# Patient Record
Sex: FEMALE | Race: WHITE | NOT HISPANIC OR LATINO | URBAN - METROPOLITAN AREA
[De-identification: names, ages, dates, MRNs, and addresses within clinical notes are randomized per-mention and may not be internally consistent; named-entity substitution may affect disease eponyms.]

---

## 2019-09-11 ENCOUNTER — INPATIENT (INPATIENT)
Facility: HOSPITAL | Age: 84
LOS: 2 days | Discharge: HOME | DRG: 101 | End: 2019-09-14
Attending: HOSPITALIST | Admitting: HOSPITALIST
Payer: MEDICARE

## 2019-09-11 VITALS
DIASTOLIC BLOOD PRESSURE: 78 MMHG | SYSTOLIC BLOOD PRESSURE: 133 MMHG | TEMPERATURE: 98 F | HEART RATE: 64 BPM | OXYGEN SATURATION: 100 % | RESPIRATION RATE: 17 BRPM

## 2019-09-11 DIAGNOSIS — R56.9 UNSPECIFIED CONVULSIONS: ICD-10-CM

## 2019-09-11 LAB
ALBUMIN SERPL ELPH-MCNC: 3.5 G/DL — SIGNIFICANT CHANGE UP (ref 3.3–5)
ALP SERPL-CCNC: 75 U/L — SIGNIFICANT CHANGE UP (ref 40–120)
ALT FLD-CCNC: 9 U/L — LOW (ref 10–45)
ANION GAP SERPL CALC-SCNC: 12 MMOL/L — SIGNIFICANT CHANGE UP (ref 5–17)
APPEARANCE UR: CLEAR — SIGNIFICANT CHANGE UP
APTT BLD: 28.7 SEC — SIGNIFICANT CHANGE UP (ref 27.5–36.3)
AST SERPL-CCNC: 12 U/L — SIGNIFICANT CHANGE UP (ref 10–40)
BACTERIA # UR AUTO: ABNORMAL
BASOPHILS # BLD AUTO: 0.1 K/UL — SIGNIFICANT CHANGE UP (ref 0–0.2)
BASOPHILS NFR BLD AUTO: 0.7 % — SIGNIFICANT CHANGE UP (ref 0–2)
BILIRUB SERPL-MCNC: 0.3 MG/DL — SIGNIFICANT CHANGE UP (ref 0.2–1.2)
BILIRUB UR-MCNC: NEGATIVE — SIGNIFICANT CHANGE UP
BUN SERPL-MCNC: 19 MG/DL — SIGNIFICANT CHANGE UP (ref 7–23)
CALCIUM SERPL-MCNC: 8.6 MG/DL — SIGNIFICANT CHANGE UP (ref 8.4–10.5)
CHLORIDE SERPL-SCNC: 104 MMOL/L — SIGNIFICANT CHANGE UP (ref 96–108)
CO2 SERPL-SCNC: 24 MMOL/L — SIGNIFICANT CHANGE UP (ref 22–31)
COLOR SPEC: COLORLESS — SIGNIFICANT CHANGE UP
CREAT SERPL-MCNC: 0.92 MG/DL — SIGNIFICANT CHANGE UP (ref 0.5–1.3)
DIFF PNL FLD: ABNORMAL
EOSINOPHIL # BLD AUTO: 0.4 K/UL — SIGNIFICANT CHANGE UP (ref 0–0.5)
EOSINOPHIL NFR BLD AUTO: 4.2 % — SIGNIFICANT CHANGE UP (ref 0–6)
EPI CELLS # UR: 1 /HPF — SIGNIFICANT CHANGE UP
GAS PNL BLDV: SIGNIFICANT CHANGE UP
GLUCOSE SERPL-MCNC: 102 MG/DL — HIGH (ref 70–99)
GLUCOSE UR QL: NEGATIVE — SIGNIFICANT CHANGE UP
HCT VFR BLD CALC: 42 % — SIGNIFICANT CHANGE UP (ref 34.5–45)
HGB BLD-MCNC: 13.5 G/DL — SIGNIFICANT CHANGE UP (ref 11.5–15.5)
HYALINE CASTS # UR AUTO: 0 /LPF — SIGNIFICANT CHANGE UP (ref 0–2)
INR BLD: 0.96 RATIO — SIGNIFICANT CHANGE UP (ref 0.88–1.16)
KETONES UR-MCNC: NEGATIVE — SIGNIFICANT CHANGE UP
LEUKOCYTE ESTERASE UR-ACNC: ABNORMAL
LYMPHOCYTES # BLD AUTO: 2.5 K/UL — SIGNIFICANT CHANGE UP (ref 1–3.3)
LYMPHOCYTES # BLD AUTO: 28 % — SIGNIFICANT CHANGE UP (ref 13–44)
MCHC RBC-ENTMCNC: 29.7 PG — SIGNIFICANT CHANGE UP (ref 27–34)
MCHC RBC-ENTMCNC: 32.1 GM/DL — SIGNIFICANT CHANGE UP (ref 32–36)
MCV RBC AUTO: 92.4 FL — SIGNIFICANT CHANGE UP (ref 80–100)
MONOCYTES # BLD AUTO: 0.9 K/UL — SIGNIFICANT CHANGE UP (ref 0–0.9)
MONOCYTES NFR BLD AUTO: 9.9 % — SIGNIFICANT CHANGE UP (ref 2–14)
NEUTROPHILS # BLD AUTO: 5 K/UL — SIGNIFICANT CHANGE UP (ref 1.8–7.4)
NEUTROPHILS NFR BLD AUTO: 57.3 % — SIGNIFICANT CHANGE UP (ref 43–77)
NITRITE UR-MCNC: POSITIVE
PH UR: 7 — SIGNIFICANT CHANGE UP (ref 5–8)
PLATELET # BLD AUTO: 219 K/UL — SIGNIFICANT CHANGE UP (ref 150–400)
POTASSIUM SERPL-MCNC: 4 MMOL/L — SIGNIFICANT CHANGE UP (ref 3.5–5.3)
POTASSIUM SERPL-SCNC: 4 MMOL/L — SIGNIFICANT CHANGE UP (ref 3.5–5.3)
PROT SERPL-MCNC: 6.2 G/DL — SIGNIFICANT CHANGE UP (ref 6–8.3)
PROT UR-MCNC: NEGATIVE — SIGNIFICANT CHANGE UP
PROTHROM AB SERPL-ACNC: 10.9 SEC — SIGNIFICANT CHANGE UP (ref 10–12.9)
RBC # BLD: 4.54 M/UL — SIGNIFICANT CHANGE UP (ref 3.8–5.2)
RBC # FLD: 12.1 % — SIGNIFICANT CHANGE UP (ref 10.3–14.5)
RBC CASTS # UR COMP ASSIST: 5 /HPF — HIGH (ref 0–4)
SODIUM SERPL-SCNC: 140 MMOL/L — SIGNIFICANT CHANGE UP (ref 135–145)
SP GR SPEC: 1.01 — LOW (ref 1.01–1.02)
UROBILINOGEN FLD QL: NEGATIVE — SIGNIFICANT CHANGE UP
WBC # BLD: 8.8 K/UL — SIGNIFICANT CHANGE UP (ref 3.8–10.5)
WBC # FLD AUTO: 8.8 K/UL — SIGNIFICANT CHANGE UP (ref 3.8–10.5)
WBC UR QL: 7 /HPF — HIGH (ref 0–5)

## 2019-09-11 PROCEDURE — 93010 ELECTROCARDIOGRAM REPORT: CPT

## 2019-09-11 PROCEDURE — 71045 X-RAY EXAM CHEST 1 VIEW: CPT | Mod: 26

## 2019-09-11 PROCEDURE — 99285 EMERGENCY DEPT VISIT HI MDM: CPT

## 2019-09-11 PROCEDURE — 99497 ADVNCD CARE PLAN 30 MIN: CPT | Mod: 25

## 2019-09-11 PROCEDURE — 70450 CT HEAD/BRAIN W/O DYE: CPT | Mod: 26

## 2019-09-11 PROCEDURE — 99223 1ST HOSP IP/OBS HIGH 75: CPT

## 2019-09-11 RX ORDER — SODIUM CHLORIDE 9 MG/ML
1000 INJECTION INTRAMUSCULAR; INTRAVENOUS; SUBCUTANEOUS ONCE
Refills: 0 | Status: COMPLETED | OUTPATIENT
Start: 2019-09-11 | End: 2019-09-11

## 2019-09-11 RX ORDER — ENOXAPARIN SODIUM 100 MG/ML
40 INJECTION SUBCUTANEOUS DAILY
Refills: 0 | Status: DISCONTINUED | OUTPATIENT
Start: 2019-09-11 | End: 2019-09-14

## 2019-09-11 RX ORDER — CEFTRIAXONE 500 MG/1
1000 INJECTION, POWDER, FOR SOLUTION INTRAMUSCULAR; INTRAVENOUS ONCE
Refills: 0 | Status: COMPLETED | OUTPATIENT
Start: 2019-09-11 | End: 2019-09-11

## 2019-09-11 RX ADMIN — CEFTRIAXONE 100 MILLIGRAM(S): 500 INJECTION, POWDER, FOR SOLUTION INTRAMUSCULAR; INTRAVENOUS at 21:30

## 2019-09-11 RX ADMIN — SODIUM CHLORIDE 1000 MILLILITER(S): 9 INJECTION INTRAMUSCULAR; INTRAVENOUS; SUBCUTANEOUS at 18:54

## 2019-09-11 NOTE — ED PROVIDER NOTE - OBJECTIVE STATEMENT
Pt is coming from  [facility], pt is nonverbal at baseline and had a seizure today at 3 PM and had no hx of trauma, she was sitting and this is her first time seizure,   not normally on oxygen.   PMH DM/HTN/HLD/CHF  Pts daughter arrived at 4PM and was having these generalized jerks. PT in the emergency room arrived and is not at mental status baseline. She is intermittently arousable able to state "hi".   PT per daughter is not at her usual mental status, she is more talkative.

## 2019-09-11 NOTE — ED ADULT NURSE NOTE - NSIMPLEMENTINTERV_GEN_ALL_ED
Implemented All Fall with Harm Risk Interventions:  [place] to call system. Call bell, personal items and telephone within reach. Instruct patient to call for assistance. Room bathroom lighting operational. Non-slip footwear when patient is off stretcher. Physically safe environment: no spills, clutter or unnecessary equipment. Stretcher in lowest position, wheels locked, appropriate side rails in place. Provide visual cue, wrist band, yellow gown, etc. Monitor gait and stability. Monitor for mental status changes and reorient to person, place, and time. Review medications for side effects contributing to fall risk. Reinforce activity limits and safety measures with patient and family. Provide visual clues: red socks.

## 2019-09-11 NOTE — ED ADULT NURSE NOTE - OBJECTIVE STATEMENT
Pt presents to ED from nursing home accompanied by family s/p suspected seizure. Family states pt has h/o dementia and is normally A&Ox1, nonverbal besides "yes" and "no" and recognizes her family. Pt can take a few steps but mostly goes from bed to chair. Nursing home staff called daughter to tell her pt had an episode of entire body shaking follow by "twitching" and sleepiness. Pt on arrival is responsive to pain only and not speaking. MAEx4. Respirations are even and unlabored. Pt presents to ED from nursing home via EMS accompanied by family s/p suspected seizure. Family states pt has h/o dementia and is normally A&Ox1, nonverbal besides "yes" and "no" answers and recognizes her family. Pt can take a few steps but mostly goes from bed to chair. Nursing home staff called daughter to tell her pt had an episode of entire body shaking follow by "twitching" and sleepiness. Daughter reports a few weeks ago pt "slid out of the wheelchair" and reportedly did not hit her head or seek medical care at that time. Pt on arrival is responsive to pain only and not speaking. Unable to follow commands. MAEx4. Respirations are even and unlabored.

## 2019-09-11 NOTE — ED PROVIDER NOTE - PROGRESS NOTE DETAILS
Neurology was paged, for concern the patient is having prolonged post ictal period Spoke to  Adolfo Lara who reports witnessed event- upper and lower extremities shaking associated with eyes rolled back unsure duration but either seconds or a minute.  After patient was not at her normal baseline adding patient usually sits up and looks around, does not say anything but usually seems alert however patient just laying down.  No history of seizures. Meggan Blanco MD per patients daughter, the patient is currently improving w/ her mental status

## 2019-09-11 NOTE — ED PROVIDER NOTE - CLINICAL SUMMARY MEDICAL DECISION MAKING FREE TEXT BOX
83 F w/ PMH of dementia, minimally verbal at baseline (pt states the family is not at her normal mental status baseline p/w likely new onset seizure. Generalized jerks and eyes rolled back in the patients head per the patients daughter.     plan for labs, ekg and 83 F w/ PMH of dementia, minimally verbal at baseline (pt states the family is not at her normal mental status baseline p/w likely new onset seizure. Generalized jerks and eyes rolled back in the patients head per the patients daughter.   plan for labs, ekg and CT head

## 2019-09-11 NOTE — ED CLERICAL - NS ED CLERK NOTE PRE-ARRIVAL INFORMATION; ADDITIONAL PRE-ARRIVAL INFORMATION
CC/Reason For referral: pt needs cat-scan of head/ onset of siezures  Preferred Consultant(if applicable):  Who admits for you (if needed):  Do you have documents you would like to fax over?  Would you still like to speak to an ED attending? yes

## 2019-09-11 NOTE — ED PROVIDER NOTE - PHYSICAL EXAMINATION
I have reviewed the triage vital signs.    Const: obese,  deconditioned chronically ill appearing  Eyes: no conjunctival injection and no scleral icterus  ENMT: Atraumatic external nose and ears, Moist mucus membranes, pupils are 4 mm reactive bilaterally  Neck: Symmetric, trachea midline,  CVS: +S1/S2, radial pulse 2+ bilaterally  RESP: Unlabored respiratory effort, Clear to auscultation bilaterally  GI: Nontender/Nondistended soft abdomen  MSK: Normocephalic/Atraumatic, Extremities w/o deformity or ttp, stiff in lower extremities and flacid in upper extremities  Skin: Warm, Dry w/ no rashes  Neuro: able to move bilateral arms and lower legs are stiff, withdraws from pain in arms and legs,   Psych: Awake, Alert, & Orientedx3;  Appropriate mood and affect, cooperative I have reviewed the triage vital signs.    Const: obese,  deconditioned chronically ill appearing  Eyes: no conjunctival injection and no scleral icterus  ENMT: Atraumatic external nose and ears, Moist mucus membranes, pupils are 4 mm reactive bilaterally  Neck: Symmetric, trachea midline,  CVS: +S1/S2, radial pulse 2+ bilaterally  RESP: Unlabored respiratory effort, Clear to auscultation bilaterally  GI: Nontender/Nondistended soft abdomen  MSK: Normocephalic/Atraumatic, Extremities w/o deformity or ttp, stiff in lower extremities and flacid in upper extremities  Skin: Warm, Dry w/ no rashes  Neuro: able to move bilateral arms and lower legs are stiff, withdraws from pain in arms and legs,   Psych: unable to assess

## 2019-09-12 DIAGNOSIS — R94.31 ABNORMAL ELECTROCARDIOGRAM [ECG] [EKG]: ICD-10-CM

## 2019-09-12 DIAGNOSIS — Z98.890 OTHER SPECIFIED POSTPROCEDURAL STATES: Chronic | ICD-10-CM

## 2019-09-12 DIAGNOSIS — N39.0 URINARY TRACT INFECTION, SITE NOT SPECIFIED: ICD-10-CM

## 2019-09-12 DIAGNOSIS — R56.9 UNSPECIFIED CONVULSIONS: ICD-10-CM

## 2019-09-12 DIAGNOSIS — E11.9 TYPE 2 DIABETES MELLITUS WITHOUT COMPLICATIONS: ICD-10-CM

## 2019-09-12 DIAGNOSIS — I10 ESSENTIAL (PRIMARY) HYPERTENSION: ICD-10-CM

## 2019-09-12 DIAGNOSIS — G30.9 ALZHEIMER'S DISEASE, UNSPECIFIED: ICD-10-CM

## 2019-09-12 LAB
AMMONIA BLD-MCNC: 20 UMOL/L — SIGNIFICANT CHANGE UP (ref 11–55)
ANION GAP SERPL CALC-SCNC: 13 MMOL/L — SIGNIFICANT CHANGE UP (ref 5–17)
BUN SERPL-MCNC: 14 MG/DL — SIGNIFICANT CHANGE UP (ref 7–23)
CALCIUM SERPL-MCNC: 8.4 MG/DL — SIGNIFICANT CHANGE UP (ref 8.4–10.5)
CHLORIDE SERPL-SCNC: 107 MMOL/L — SIGNIFICANT CHANGE UP (ref 96–108)
CO2 SERPL-SCNC: 23 MMOL/L — SIGNIFICANT CHANGE UP (ref 22–31)
CREAT SERPL-MCNC: 0.81 MG/DL — SIGNIFICANT CHANGE UP (ref 0.5–1.3)
CRP SERPL-MCNC: 0.41 MG/DL — HIGH (ref 0–0.4)
ERYTHROCYTE [SEDIMENTATION RATE] IN BLOOD: 28 MM/HR — HIGH (ref 0–20)
GLUCOSE SERPL-MCNC: 87 MG/DL — SIGNIFICANT CHANGE UP (ref 70–99)
HCT VFR BLD CALC: 40.2 % — SIGNIFICANT CHANGE UP (ref 34.5–45)
HGB BLD-MCNC: 12.7 G/DL — SIGNIFICANT CHANGE UP (ref 11.5–15.5)
MAGNESIUM SERPL-MCNC: 2.3 MG/DL — SIGNIFICANT CHANGE UP (ref 1.6–2.6)
MCHC RBC-ENTMCNC: 29.4 PG — SIGNIFICANT CHANGE UP (ref 27–34)
MCHC RBC-ENTMCNC: 31.6 GM/DL — LOW (ref 32–36)
MCV RBC AUTO: 93.1 FL — SIGNIFICANT CHANGE UP (ref 80–100)
PHOSPHATE SERPL-MCNC: 2.9 MG/DL — SIGNIFICANT CHANGE UP (ref 2.5–4.5)
PLATELET # BLD AUTO: 196 K/UL — SIGNIFICANT CHANGE UP (ref 150–400)
POTASSIUM SERPL-MCNC: 4.4 MMOL/L — SIGNIFICANT CHANGE UP (ref 3.5–5.3)
POTASSIUM SERPL-SCNC: 4.4 MMOL/L — SIGNIFICANT CHANGE UP (ref 3.5–5.3)
PROCALCITONIN SERPL-MCNC: 0.04 NG/ML — SIGNIFICANT CHANGE UP (ref 0.02–0.1)
PROLACTIN SERPL-MCNC: 21.5 NG/ML — SIGNIFICANT CHANGE UP (ref 3.4–24.1)
RBC # BLD: 4.32 M/UL — SIGNIFICANT CHANGE UP (ref 3.8–5.2)
RBC # FLD: 13.2 % — SIGNIFICANT CHANGE UP (ref 10.3–14.5)
SODIUM SERPL-SCNC: 143 MMOL/L — SIGNIFICANT CHANGE UP (ref 135–145)
T PALLIDUM AB TITR SER: NEGATIVE — SIGNIFICANT CHANGE UP
TSH SERPL-MCNC: 1.79 UIU/ML — SIGNIFICANT CHANGE UP (ref 0.27–4.2)
WBC # BLD: 7.93 K/UL — SIGNIFICANT CHANGE UP (ref 3.8–10.5)
WBC # FLD AUTO: 7.93 K/UL — SIGNIFICANT CHANGE UP (ref 3.8–10.5)

## 2019-09-12 PROCEDURE — 95816 EEG AWAKE AND DROWSY: CPT | Mod: 26

## 2019-09-12 PROCEDURE — 99222 1ST HOSP IP/OBS MODERATE 55: CPT

## 2019-09-12 PROCEDURE — 12345: CPT | Mod: NC

## 2019-09-12 RX ORDER — DEXTROSE 50 % IN WATER 50 %
25 SYRINGE (ML) INTRAVENOUS ONCE
Refills: 0 | Status: DISCONTINUED | OUTPATIENT
Start: 2019-09-12 | End: 2019-09-14

## 2019-09-12 RX ORDER — AMLODIPINE BESYLATE 2.5 MG/1
5 TABLET ORAL DAILY
Refills: 0 | Status: DISCONTINUED | OUTPATIENT
Start: 2019-09-12 | End: 2019-09-14

## 2019-09-12 RX ORDER — METFORMIN HYDROCHLORIDE 850 MG/1
1 TABLET ORAL
Qty: 0 | Refills: 0 | DISCHARGE

## 2019-09-12 RX ORDER — GLUCAGON INJECTION, SOLUTION 0.5 MG/.1ML
1 INJECTION, SOLUTION SUBCUTANEOUS ONCE
Refills: 0 | Status: DISCONTINUED | OUTPATIENT
Start: 2019-09-12 | End: 2019-09-14

## 2019-09-12 RX ORDER — INSULIN LISPRO 100/ML
VIAL (ML) SUBCUTANEOUS AT BEDTIME
Refills: 0 | Status: DISCONTINUED | OUTPATIENT
Start: 2019-09-12 | End: 2019-09-14

## 2019-09-12 RX ORDER — INFLUENZA VIRUS VACCINE 15; 15; 15; 15 UG/.5ML; UG/.5ML; UG/.5ML; UG/.5ML
0.5 SUSPENSION INTRAMUSCULAR ONCE
Refills: 0 | Status: DISCONTINUED | OUTPATIENT
Start: 2019-09-12 | End: 2019-09-14

## 2019-09-12 RX ORDER — AMLODIPINE BESYLATE 2.5 MG/1
1 TABLET ORAL
Qty: 0 | Refills: 0 | DISCHARGE

## 2019-09-12 RX ORDER — VENLAFAXINE HCL 75 MG
75 CAPSULE, EXT RELEASE 24 HR ORAL DAILY
Refills: 0 | Status: DISCONTINUED | OUTPATIENT
Start: 2019-09-12 | End: 2019-09-14

## 2019-09-12 RX ORDER — INSULIN LISPRO 100/ML
VIAL (ML) SUBCUTANEOUS
Refills: 0 | Status: DISCONTINUED | OUTPATIENT
Start: 2019-09-12 | End: 2019-09-14

## 2019-09-12 RX ORDER — SIMVASTATIN 20 MG/1
20 TABLET, FILM COATED ORAL AT BEDTIME
Refills: 0 | Status: DISCONTINUED | OUTPATIENT
Start: 2019-09-12 | End: 2019-09-14

## 2019-09-12 RX ORDER — SIMVASTATIN 20 MG/1
1 TABLET, FILM COATED ORAL
Qty: 0 | Refills: 0 | DISCHARGE

## 2019-09-12 RX ORDER — DEXTROSE 50 % IN WATER 50 %
12.5 SYRINGE (ML) INTRAVENOUS ONCE
Refills: 0 | Status: DISCONTINUED | OUTPATIENT
Start: 2019-09-12 | End: 2019-09-14

## 2019-09-12 RX ORDER — CEFTRIAXONE 500 MG/1
1000 INJECTION, POWDER, FOR SOLUTION INTRAMUSCULAR; INTRAVENOUS EVERY 24 HOURS
Refills: 0 | Status: DISCONTINUED | OUTPATIENT
Start: 2019-09-12 | End: 2019-09-14

## 2019-09-12 RX ORDER — MONTELUKAST 4 MG/1
1 TABLET, CHEWABLE ORAL
Qty: 0 | Refills: 0 | DISCHARGE

## 2019-09-12 RX ORDER — DEXTROSE 50 % IN WATER 50 %
15 SYRINGE (ML) INTRAVENOUS ONCE
Refills: 0 | Status: DISCONTINUED | OUTPATIENT
Start: 2019-09-12 | End: 2019-09-14

## 2019-09-12 RX ORDER — SODIUM CHLORIDE 9 MG/ML
1000 INJECTION, SOLUTION INTRAVENOUS
Refills: 0 | Status: DISCONTINUED | OUTPATIENT
Start: 2019-09-12 | End: 2019-09-14

## 2019-09-12 RX ORDER — SITAGLIPTIN 50 MG/1
1 TABLET, FILM COATED ORAL
Qty: 0 | Refills: 0 | DISCHARGE

## 2019-09-12 RX ADMIN — SIMVASTATIN 20 MILLIGRAM(S): 20 TABLET, FILM COATED ORAL at 22:06

## 2019-09-12 RX ADMIN — ENOXAPARIN SODIUM 40 MILLIGRAM(S): 100 INJECTION SUBCUTANEOUS at 11:43

## 2019-09-12 RX ADMIN — CEFTRIAXONE 100 MILLIGRAM(S): 500 INJECTION, POWDER, FOR SOLUTION INTRAMUSCULAR; INTRAVENOUS at 22:06

## 2019-09-12 RX ADMIN — Medication 75 MILLIGRAM(S): at 22:06

## 2019-09-12 RX ADMIN — AMLODIPINE BESYLATE 5 MILLIGRAM(S): 2.5 TABLET ORAL at 06:30

## 2019-09-12 RX ADMIN — SODIUM CHLORIDE 70 MILLILITER(S): 9 INJECTION, SOLUTION INTRAVENOUS at 22:35

## 2019-09-12 RX ADMIN — SODIUM CHLORIDE 70 MILLILITER(S): 9 INJECTION, SOLUTION INTRAVENOUS at 00:57

## 2019-09-12 NOTE — PROGRESS NOTE ADULT - PROBLEM SELECTOR PLAN 3
- Memantine on hold ; unclear benefit given advanced dementia  -Aspiration Precautions  -Per daughter pt is DNR , no feeding tube ; MOLST form in chart

## 2019-09-12 NOTE — H&P ADULT - ASSESSMENT
83F with PMHx of severe dementia (likely Alzheimer's, fully dependent on ADLs and care, only says several words per day), HTN, and T2DM presents to [facility] for one day of seizure-like activity. Per nursing home staff, patient noted to have generalized limb shaking with eyes rolling back that lasted for several minutes. Afterwards, patient did not return to baseline (possible post-ictal state?) and brought into [facility]. On exam VS are unremarkable, but patient is not responsive to verbal stimuli (though she does withdraw to pain), is protecting her airway and moving all her limbs, but noted to have pinpoint pupils which are minimally reactive to light. Labs are mostly unremarkable with no leukocytosis or hypoglycemia, but minimal pyuria. CTH unremarkable and EKG showing possible RBBB with slightly prolonged QTc at 483. Patient currently exhibiting very fine and low amplitude movements of her lower extremity. At this point unsure what exactly was witnessed in the nursing home. Generalized tonic clonic seizure with post-ictal state is a possibility, but patient does not have prior history or known brain lesion which would pre-dispose her. Also have to consider mimicker of seizure such as convulsive syncope (patient does appear slightly dehydrated and is on Lasix). Her pinpoint pupils are concerning for possible medication cause, but no alarm signs requiring acute intervention and likely will slowly metabolize whatever she may have been given. Lastly, infection is certainly a possibility despite no leukocytosis or fever. She does have bacteriuria and pyuria, but is unable to endorse symptoms. 83F with PMHx of severe dementia (likely Alzheimer's, fully dependent on ADLs and care, only says several words per day), HTN, and T2DM presents to [facility] for one day of seizure-like activity. Per nursing home staff, patient noted to have generalized limb shaking with eyes rolling back that lasted for several minutes. Afterwards, patient did not return to baseline (possible post-ictal state?) and brought into [facility]. On exam VS are unremarkable, but patient is not responsive to verbal stimuli (though she does withdraw to pain), is protecting her airway and moving all her limbs, but noted to have pinpoint pupils which are minimally reactive to light. Labs are mostly unremarkable with no leukocytosis or hypoglycemia, but minimal pyuria. CTH unremarkable and EKG showing possible RBBB with slightly prolonged QTc at 483. Patient currently exhibiting very fine and low amplitude movements of her lower extremity. At this point unsure what exactly was witnessed in the nursing home. Generalized tonic clonic seizure with post-ictal state is a possibility, but patient does not have prior history or known brain lesion which would pre-dispose her. Also have to consider mimicker of seizure such as convulsive syncope (patient does appear slightly dehydrated and is on Lasix). Her pinpoint pupils are concerning for possible medication cause, but no alarm signs requiring acute intervention and likely will slowly metabolize whatever she may have been given. Lastly, infection is certainly a possibility despite no leukocytosis or fever. She does have bacteriuria and pyuria, but is unable to endorse symptoms.  At this point I don't suspect the low amplitude movements she is having right now as seizure given the fact that she withdraws to pain while this is occurring 83F with PMHx of severe dementia (likely Alzheimer's, fully dependent on ADLs and care, only says several words per day), HTN, and T2DM presents to [facility] for one day of seizure-like activity. Per nursing home staff, patient noted to have generalized limb shaking with eyes rolling back that lasted for several minutes. Afterwards, patient did not return to baseline (possible post-ictal state?) and brought into [facility]. On exam VS are unremarkable, but patient is not responsive to verbal stimuli (though she does withdraw to pain), is protecting her airway and moving all her limbs, but noted to have pinpoint pupils which are minimally reactive to light. Labs are mostly unremarkable with no leukocytosis or hypoglycemia with minimal pyuria. CTH unremarkable and EKG showing possible RBBB with slightly prolonged QTc at 483. Patient currently exhibiting very fine and low amplitude movements of her lower extremity. At this point unsure what exactly was witnessed in the nursing home. Generalized tonic clonic seizure with post-ictal state is a possibility, but patient does not have prior history or known brain lesion which would pre-dispose her. Also have to consider mimicker of seizure such as convulsive syncope (patient does appear slightly dehydrated and is on Lasix). Her pinpoint pupils are concerning for possible medication cause, but no alarm signs requiring acute intervention and likely will slowly metabolize whatever she may have been given. Lastly, infection is certainly a possibility despite no leukocytosis or fever. She does have bacteriuria and pyuria, but is unable to endorse symptoms.  At this point I don't suspect the low amplitude movements she is having right now as seizure given the fact that she withdraws to pain while this is occurring

## 2019-09-12 NOTE — CONSULT NOTE ADULT - ASSESSMENT
Assessment:    Impression:    Plan:      Case to be discussed with epilepsy attending, Dr. Kat. Assessment:    Impression:    Plan:  Imaging:  MRI brain epilepsy protocol    Case to be discussed with epilepsy attending, Dr. Kat. Assessment: 83 year old RH F with a PMH of severe dementia, HTN, and T2DM presents with witnessed shaking of the upper/lower extremities with eye rolling on 9/11. Physical exam significant for spontaneous jerking movements of RLE and RUE. CTH showed no acute findings.     Impression: Possible focal seizures with secondary generalization, possible myoclonus in setting of infection    Plan:  Imaging:  MRI brain epilepsy protocol    Labs:  Magnesium, Ammonia, TFT's, ESR/CRP, Phosphorous  Urinalysis positive for UTI.    Other:  VEEG monitoring  Neurochecks q4h  UTI management as per primary team  Aspiration/seizure precautions    Case to be discussed with epilepsy attending, Dr. Kat. Assessment: 83 year old RH F with a PMH of severe dementia, HTN, and T2DM presents with witnessed shaking of the upper/lower extremities with eye rolling on 9/11. Physical exam significant for spontaneous jerking movements of RLE and RUE. CTH showed no acute findings.     Impression: Possible focal seizures with secondary generalization in setting of infection    Plan:  Imaging:  MRI brain epilepsy protocol    Labs:  Magnesium, Ammonia, TFT's, ESR/CRP, Phosphorous  Urinalysis positive for UTI.    Other:  VEEG monitoring  Neurochecks q4h  UTI management as per primary team  Aspiration/seizure precautions    Case to be discussed with epilepsy attending, Dr. Kat. Assessment: 83 year old RH F with a PMH of severe dementia, HTN, and T2DM presents with witnessed shaking of the upper/lower extremities with eye rolling on 9/11. Physical exam significant for spontaneous jerking movements of RLE and RUE. CTH showed no acute findings.     Impression: Possible focal seizures with secondary generalization in setting of infection    Plan:  Imaging:  MRI brain epilepsy protocol    Labs:  Magnesium, Ammonia, TFT's, ESR/CRP, Phosphorous  Urinalysis positive for UTI.    Medications:  Ativan 2 mg for seizure >3 minutes    Other:  VEEG monitoring  Neurochecks q4h  UTI management as per primary team  Aspiration/seizure precautions    Case to be discussed with epilepsy attending, Dr. Kat. Assessment: 83 year old RH F with a PMH of severe dementia, HTN, and T2DM presents with witnessed shaking of the upper/lower extremities with eye rolling on 9/11. Physical exam significant for spontaneous jerking movements of RLE and RUE. CTH showed no acute findings.     Impression:   delirium, UTI. in setting of severe dementia tremulousness  possible seizures, unclear    Plan:    Labs:  cbc, bmp, lft  Magnesium, Ammonia, TFT's, ESR/CRP, Phosphorous  Urinalysis positive for UTI.    Medications:  Ativan 1-2 mg for overt seizure >3 minutes    Other:  VEEG monitoring  Neurochecks q4h  UTI management as per primary team  Aspiration/seizure precautions    Case to be discussed with epilepsy attending, Dr. Kat.

## 2019-09-12 NOTE — PROGRESS NOTE ADULT - ASSESSMENT
83F with PMHx of severe dementia (likely Alzheimer's, fully dependent on ADLs and care, only says several words per day), HTN, and T2DM presents to [facility] for one day of seizure-like activity. Per nursing home staff, patient noted to have generalized limb shaking with eyes rolling back that lasted for several minutes. Afterwards, patient did not return to baseline (possible post-ictal state?) and brought into [facility]. On exam VS are unremarkable, but patient is not responsive to verbal stimuli (though she does withdraw to pain), is protecting her airway and moving all her limbs, but noted to have pinpoint pupils which are minimally reactive to light. Labs are mostly unremarkable with no leukocytosis or hypoglycemia with minimal pyuria. CTH unremarkable and EKG showing possible RBBB with slightly prolonged QTc at 483. Patient currently exhibiting very fine and low amplitude movements of her lower extremity. At this point unsure what exactly was witnessed in the nursing home. Generalized tonic clonic seizure with post-ictal state is a possibility, but patient does not have prior history or known brain lesion which would pre-dispose her. Also have to consider mimicker of seizure such as convulsive syncope (patient does appear slightly dehydrated and is on Lasix). Her pinpoint pupils are concerning for possible medication cause, but no alarm signs requiring acute intervention and likely will slowly metabolize whatever she may have been given. Lastly, infection is certainly a possibility despite no leukocytosis or fever. She does have bacteriuria and pyuria, but is unable to endorse symptoms.  At this point I don't suspect the low amplitude movements she is having right now as seizure given the fact that she withdraws to pain while this is occurring 83F with PMHx of advanced dementia , minimally verbal, HTN, and T2DM who presents to [facility] for one day of seizure-like activity. Found to have bacteriuria and pyuria, but is unable to endorse symptoms.

## 2019-09-12 NOTE — H&P ADULT - PROBLEM SELECTOR PLAN 4
-Hold home metformin and Januvia as inpatient  -FS q6 hours and short acting coverage for now while patient NPO

## 2019-09-12 NOTE — PROGRESS NOTE ADULT - PROBLEM SELECTOR PLAN 1
- per family mental status is back to baseline  - seizure may have been 2/2 infectious process ( UTI)  - will restart Mechanical Soft diet w/assisted feeds  - Aspiration precautions  - f/up Prolactin Level, TSH, and RPR  -UTOX negative  - Neurochecks q4h  -Prolonged EEG ordered  - Brain MRI  per Neuro reccs  -Neurology following  - Lasix held  and given gentle hydration for possible convulsive syncope

## 2019-09-12 NOTE — PATIENT PROFILE ADULT - INDICATE TYPE
Do Not Resuscitate (DNR)/Medical Orders for Life-Sustaining Treatment (MOLST)/Health Care Proxy (HCP)/DNI

## 2019-09-12 NOTE — H&P ADULT - PROBLEM SELECTOR PLAN 3
-Aspiration Precautions  -Per daughter: no feeding tube if it ever reached that point (though studies have shown feeding tube does not decrease mortality nor does it decrease rates of aspiration pneumonia)  -Hold Memantine as in-patient (I question the clinical significant of this medication at this point given patient's severe dementia. Unlikely it is providing any palpable benefit)

## 2019-09-12 NOTE — H&P ADULT - NSHPPHYSICALEXAM_GEN_ALL_CORE
T(C): 36.8 (09-11-19 @ 23:27), Max: 36.8 (09-11-19 @ 23:27)  HR: 68 (09-11-19 @ 23:27) (64 - 68)  BP: 156/81 (09-11-19 @ 23:27) (133/78 - 156/81)  RR: 20 (09-11-19 @ 23:27) (17 - 20)  SpO2: 97% (09-11-19 @ 23:27) (97% - 100%)    Gen: female in NAD, appears comfortable, no diaphoresis, protecting airway  HEENT: NCAT, MMM, neck soft and supple, pinpoint pupils minimally reactive to light  CV: +S1/S2, no m/r/g  Resp: CTAB, no w/r/r  GI: normoactive BS, soft, NTND, no rebounding/guarding  Ext: No LE edema, extremities appear warm and well perfused   Neuro: patient withdraws to pain, not responsive to verbal stimuli, moving all extremities equally  Psych: flat affect, unable to assess auditory or visual hallucinations  Skin: no petechiae, ecchymosis or maculopapular rash noted

## 2019-09-12 NOTE — H&P ADULT - NSHPLABSRESULTS_GEN_ALL_CORE
CXR personally reviewed by me: no consolidation    CT Head personally reviewed by me: some generalized atrophy

## 2019-09-12 NOTE — CONSULT NOTE ADULT - SUBJECTIVE AND OBJECTIVE BOX
HPI:  83F with PMHx of severe dementia (likely Alzheimer's, fully dependent on ADLs and care, only says several words per day), HTN, and T2DM presents to [facility] for one day of seizure-like activity. Per reports from nursing home where the patient resides, she was observed to have generalized limb shaking with eyes rolling to the back of her head that may have lasted for several minutes. Shortly thereafter patient did not appear to be at her baseline which is responsive to verbal stimuli. Per daughter at bedside patient was at her baseline three days prior to admission at which time she can acknowledge verbal stimuli. Per daughter patient appears drowsy at this time (but is responsive to painful stimuli). Given patient's mentation unable to really elicit any complaints. No known history of fevers, shortness of breath, cough, dysuria, or diarrhea. There has been no new medications given or change in regimen. Nothing has been given over the counter nor anything for pain. This issue has not happened before. Per daughter, patient noted to have fine low amplitude shaking in lower extremity. In the ED neurology was called and patient given ceftriaxone for possible UTI in addition to 1 L NS. CT Head was unremarkable for any acute event. Goals of care discussion with daughter and ACP, see separate note. Patient admitted for possible seizure. (12 Sep 2019 00:14)    (Stroke only)  NIHSS:   MRS:   ICH:     REVIEW OF SYSTEMS  General:	  Skin/Breast:	  Ophthalmologic:  ENMT:	  Respiratory and Thorax:	  Cardiovascular:	  Gastrointestinal:	  Genitourinary:	  Musculoskeletal:	  Neurological:	  Psychiatric:	  Hematology/Lymphatics:	  Endocrine:	  Allergic/Immunologic:	    A 10-system ROS was performed and is negative except for those items noted above and/or in the HPI.    PAST MEDICAL & SURGICAL HISTORY:  Diabetes: type 2  HTN (hypertension)  Hyperlipidemia  Alzheimer's dementia  Status post open reduction with internal fixation (ORIF) of fracture of ankle    FAMILY HISTORY:  FH: diabetes mellitus    SOCIAL HISTORY:   T/E/D:   Occupation:   Lives with:     MEDICATIONS (HOME):  Home Medications:  acetaminophen 325 mg oral tablet: 2 tab(s) orally every 6 hours, As needed, For Temp over 38.3 C (100.94 F) (12 Sep 2019 00:06)  amLODIPine 5 mg oral tablet: 1 tab(s) orally once a day (12 Sep 2019 00:06)  Effexor 75 mg oral tablet: 1 tab(s) orally once a day (12 Sep 2019 00:06)  furosemide 40 mg oral tablet: 1 tab(s) orally once a day (12 Sep 2019 00:06)  Januvia 25 mg oral tablet: 1 tab(s) orally once a day (12 Sep 2019 00:06)  metFORMIN 500 mg oral tablet: 1 tab(s) orally 2 times a day (12 Sep 2019 00:06)  montelukast 10 mg oral tablet: 1 tab(s) orally once a day (12 Sep 2019 00:06)  Namenda 10 mg oral tablet: 1 tab(s) orally 2 times a day (12 Sep 2019 00:06)  simvastatin 20 mg oral tablet: 1 tab(s) orally once a day (at bedtime) (12 Sep 2019 00:06)    MEDICATIONS  (STANDING):  amLODIPine   Tablet 5 milliGRAM(s) Oral daily  cefTRIAXone   IVPB 1000 milliGRAM(s) IV Intermittent every 24 hours  dextrose 50% Injectable 12.5 Gram(s) IV Push once  dextrose 50% Injectable 25 Gram(s) IV Push once  dextrose 50% Injectable 25 Gram(s) IV Push once  enoxaparin Injectable 40 milliGRAM(s) SubCutaneous daily  insulin lispro (HumaLOG) corrective regimen sliding scale   SubCutaneous three times a day before meals  insulin lispro (HumaLOG) corrective regimen sliding scale   SubCutaneous at bedtime  lactated ringers. 1000 milliLiter(s) (70 mL/Hr) IV Continuous <Continuous>  simvastatin 20 milliGRAM(s) Oral at bedtime  venlafaxine 75 milliGRAM(s) Oral daily    MEDICATIONS  (PRN):  dextrose 40% Gel 15 Gram(s) Oral once PRN Blood Glucose LESS THAN 70 milliGRAM(s)/deciliter  glucagon  Injectable 1 milliGRAM(s) IntraMuscular once PRN Glucose LESS THAN 70 milligrams/deciliter    ALLERGIES/INTOLERANCES:  Allergies  No Known Allergies    Intolerances    VITALS & EXAMINATION:  Vital Signs Last 24 Hrs  T(C): 36.7 (12 Sep 2019 04:43), Max: 36.9 (12 Sep 2019 00:39)  T(F): 98.1 (12 Sep 2019 04:43), Max: 98.4 (12 Sep 2019 00:39)  HR: 73 (12 Sep 2019 04:43) (64 - 95)  BP: 153/83 (12 Sep 2019 04:43) (133/78 - 164/76)  BP(mean): --  RR: 18 (12 Sep 2019 04:43) (17 - 20)  SpO2: 96% (12 Sep 2019 04:43) (95% - 100%)    General:  Constitutional: Obese Female, appears stated age, in no apparent distress including pain  Head: Normocephalic & atraumatic.  ENT: Patent ear canals, intact TM, mucus membranes moist & pink, neck supple, no lymphadenopathy.   Respiratory: Patent airway. All lung fields are clear to auscultation bilaterally.  Extremities: No cyanosis, clubbing, or edema.  Skin: No rashes, bruising, or discoloration.    Cardiovascular (>2): RRR no murmurs. Carotid pulsations symmetric, no bruits. Normal capillary beds refill, 1-2 seconds or less.     Neurological (>12):  MS: Awake, alert, oriented to person, place, situation, time. Normal affect. Follows all commands.    Language: Speech is clear, fluent with good repetition & comprehension (able to name objects___)    CNs: PERRLA (R = 3mm, L = 3mm). VFF. EOMI no nystagmus, no diplopia. V1-3 intact to LT/pinprick, well developed masseter muscles b/l. No facial asymmetry b/l, full eye closure strength b/l. Hearing grossly normal (rubbing fingers) b/l. Symmetric palate elevation in midline. Gag reflex deferred. Head turning & shoulder shrug intact b/l. Tongue midline, normal movements, no atrophy.    Fundoscopic: pale w/ sharp discs margins No vascular changes.      Motor: Normal muscle bulk & tone. No noticeable tremor or seizure. No pronator drift.              Deltoid	Biceps	Triceps	Wrist	Finger ABd	   R	5	5	5	5	5		5 	  L	5	5	5	5	5		5    	H-Flex	H-Ext	H-ABd	H-ADd	K-Flex	K-Ext	D-Flex	P-Flex  R	5	5	5	5	5	5	5	5 	   L	5	5	5	5	5	5	5	5	     Sensation: Intact to LT/PP/Temp/Vibration/Position b/l throughout.     Cortical: Extinction on DSS (neglect): none    Reflexes:              Biceps(C5)       BR(C6)     Triceps(C7)               Patellar(L4)    Achilles(S1)    Plantar Resp  R	2	          2	             2		        2		    2		Down   L	2	          2	             2		        2		    2		Down     Coordination: intact rapid-alt movements. No dysmetria to FTN/HTS    Gait: Normal Romberg. No postural instability. Normal stance and tandem gait.     LABORATORY:  CBC                       13.5   8.8   )-----------( 219      ( 11 Sep 2019 18:59 )             42.0     Chem     140  |  104  |  19  ----------------------------<  102<H>  4.0   |  24  |  0.92    Ca    8.6      11 Sep 2019 18:59    TPro  6.2  /  Alb  3.5  /  TBili  0.3  /  DBili  x   /  AST  12  /  ALT  9<L>  /  AlkPhos  75      LFTs LIVER FUNCTIONS - ( 11 Sep 2019 18:59 )  Alb: 3.5 g/dL / Pro: 6.2 g/dL / ALK PHOS: 75 U/L / ALT: 9 U/L / AST: 12 U/L / GGT: x           Coagulopathy PT/INR - ( 11 Sep 2019 18:59 )   PT: 10.9 sec;   INR: 0.96 ratio         PTT - ( 11 Sep 2019 18:59 )  PTT:28.7 sec  Lipid Panel   A1c   Cardiac enzymes     U/A Urinalysis Basic - ( 11 Sep 2019 20:37 )    Color: Colorless / Appearance: Clear / S.008 / pH: x  Gluc: x / Ketone: Negative  / Bili: Negative / Urobili: Negative   Blood: x / Protein: Negative / Nitrite: Positive   Leuk Esterase: Moderate / RBC: 5 /hpf / WBC 7 /HPF   Sq Epi: x / Non Sq Epi: 1 /hpf / Bacteria: Many      CSF  Immunological  Other    STUDIES & IMAGING:  Studies (EKG, EEG, EMG, etc):     Radiology (XR, CT, MR, U/S, TTE/FELICIA):  < from: CT Head No Cont (19 @ 19:29) >  FINDINGS:      There is no acute intracranial mass-effect, hemorrhage, midline shift, or   abnormal extra-axial fluid collection.     Cerebral volumeloss is present with secondary proportional prominence of   the sulci and ventricles.    Chronic microvascular ischemic changes. Basal cisterns are patent.     Right sphenoid sinus mucosal thickening. The tympanomastoid cavities are   free of acute disease.. No calvarial fracture.    IMPRESSION:     No acute intracranial bleeding, mass effect, or shift.     < end of copied text > HPI:  83F with PMHx of severe dementia (likely Alzheimer's, fully dependent on ADLs and care, only says several words per day), HTN, and T2DM presents to [facility] for one day of seizure-like activity. Per reports from nursing home where the patient resides, she was observed to have generalized limb shaking with eyes rolling to the back of her head that may have lasted for several minutes. Shortly thereafter patient did not appear to be at her baseline which is responsive to verbal stimuli. Per daughter at bedside patient was at her baseline three days prior to admission at which time she can acknowledge verbal stimuli. Per daughter patient appears drowsy at this time (but is responsive to painful stimuli). Given patient's mentation unable to really elicit any complaints. No known history of fevers, shortness of breath, cough, dysuria, or diarrhea. There has been no new medications given or change in regimen. Nothing has been given over the counter nor anything for pain. This issue has not happened before. Per daughter, patient noted to have fine low amplitude shaking in lower extremity. In the ED neurology was called and patient given ceftriaxone for possible UTI in addition to 1 L NS. CT Head was unremarkable for any acute event. Goals of care discussion with daughter and ACP, see separate note. Patient admitted for possible seizure. (12 Sep 2019 00:14)    Neurology consulted:  History obtained from daughter Darcy (, Patient is right handed, Call from NH mini seizures eyes rolling straight back, jerky movements in both the arms/legs/abdomen yesterday around 3 pm. Patient never fully returned to baseline. Daughter last saw her on Monday at baseline (speaking 5 words)., No tongue biting noted. She is incontinent at baseline. She does not speak more than a word at times. She has bilateral leg weakness at baseline (uses a wheelchair). She has never had anything like this before. She has not seen a neurologist in 3 years.  Alzheimer's, strokes in past found on CT when evaluated for Alzheimer's. Denies recent illness. No changes in appetite. Patient is on a soft mechanical diet at the NH.      REVIEW OF SYSTEMS    A 10-system ROS was performed and is negative except for those items noted above and/or in the HPI.    PAST MEDICAL & SURGICAL HISTORY:  Diabetes: type 2  HTN (hypertension)  Hyperlipidemia  Alzheimer's dementia  Status post open reduction with internal fixation (ORIF) of fracture of ankle    FAMILY HISTORY:  FH: diabetes mellitus    SOCIAL HISTORY:   T/E/D: denies  Occupation: homemaker/TA  Lives with: NH    MEDICATIONS (HOME):  Home Medications:  acetaminophen 325 mg oral tablet: 2 tab(s) orally every 6 hours, As needed, For Temp over 38.3 C (100.94 F) (12 Sep 2019 00:06)  amLODIPine 5 mg oral tablet: 1 tab(s) orally once a day (12 Sep 2019 00:06)  Effexor 75 mg oral tablet: 1 tab(s) orally once a day (12 Sep 2019 00:06)  furosemide 40 mg oral tablet: 1 tab(s) orally once a day (12 Sep 2019 00:06)  Januvia 25 mg oral tablet: 1 tab(s) orally once a day (12 Sep 2019 00:06)  metFORMIN 500 mg oral tablet: 1 tab(s) orally 2 times a day (12 Sep 2019 00:06)  montelukast 10 mg oral tablet: 1 tab(s) orally once a day (12 Sep 2019 00:06)  Namenda 10 mg oral tablet: 1 tab(s) orally 2 times a day (12 Sep 2019 00:06)  simvastatin 20 mg oral tablet: 1 tab(s) orally once a day (at bedtime) (12 Sep 2019 00:06)    MEDICATIONS  (STANDING):  amLODIPine   Tablet 5 milliGRAM(s) Oral daily  cefTRIAXone   IVPB 1000 milliGRAM(s) IV Intermittent every 24 hours  dextrose 50% Injectable 12.5 Gram(s) IV Push once  dextrose 50% Injectable 25 Gram(s) IV Push once  dextrose 50% Injectable 25 Gram(s) IV Push once  enoxaparin Injectable 40 milliGRAM(s) SubCutaneous daily  insulin lispro (HumaLOG) corrective regimen sliding scale   SubCutaneous three times a day before meals  insulin lispro (HumaLOG) corrective regimen sliding scale   SubCutaneous at bedtime  lactated ringers. 1000 milliLiter(s) (70 mL/Hr) IV Continuous <Continuous>  simvastatin 20 milliGRAM(s) Oral at bedtime  venlafaxine 75 milliGRAM(s) Oral daily    MEDICATIONS  (PRN):  dextrose 40% Gel 15 Gram(s) Oral once PRN Blood Glucose LESS THAN 70 milliGRAM(s)/deciliter  glucagon  Injectable 1 milliGRAM(s) IntraMuscular once PRN Glucose LESS THAN 70 milligrams/deciliter    ALLERGIES/INTOLERANCES:  Allergies  No Known Allergies    Intolerances    VITALS & EXAMINATION:  Vital Signs Last 24 Hrs  T(C): 36.7 (12 Sep 2019 04:43), Max: 36.9 (12 Sep 2019 00:39)  T(F): 98.1 (12 Sep 2019 04:43), Max: 98.4 (12 Sep 2019 00:39)  HR: 73 (12 Sep 2019 04:43) (64 - 95)  BP: 153/83 (12 Sep 2019 04:43) (133/78 - 164/76)  BP(mean): --  RR: 18 (12 Sep 2019 04:43) (17 - 20)  SpO2: 96% (12 Sep 2019 04:43) (95% - 100%)    General:  Constitutional: Obese Female, appears stated age, in no apparent distress including pain  Head: Normocephalic & atraumatic.  ENT: Patent ear canals, intact TM, mucus membranes moist & pink, neck supple, no lymphadenopathy.   Respiratory: Patent airway. All lung fields are clear to auscultation bilaterally.  Extremities: No cyanosis, clubbing, or edema.  Skin: No rashes, bruising, or discoloration.    Cardiovascular (>2): RRR no murmurs. Carotid pulsations symmetric, no bruits. Normal capillary beds refill, 1-2 seconds or less.     Neurological (>12):  MS: Awake, alert, oriented to person, place, situation, time. Normal affect. Follows all commands.    Language: Speech is clear, fluent with good repetition & comprehension (able to name objects___)    CNs: PERRLA (R = 3mm, L = 3mm). VFF. EOMI no nystagmus, no diplopia. V1-3 intact to LT/pinprick, well developed masseter muscles b/l. No facial asymmetry b/l, full eye closure strength b/l. Hearing grossly normal (rubbing fingers) b/l. Symmetric palate elevation in midline. Gag reflex deferred. Head turning & shoulder shrug intact b/l. Tongue midline, normal movements, no atrophy.    Fundoscopic: pale w/ sharp discs margins No vascular changes.      Motor: Normal muscle bulk & tone. No noticeable tremor or seizure. No pronator drift.              Deltoid	Biceps	Triceps	Wrist	Finger ABd	   R	5	5	5	5	5		5 	  L	5	5	5	5	5		5    	H-Flex	H-Ext	H-ABd	H-ADd	K-Flex	K-Ext	D-Flex	P-Flex  R	5	5	5	5	5	5	5	5 	   L	5	5	5	5	5	5	5	5	     Sensation: Intact to LT/PP/Temp/Vibration/Position b/l throughout.     Cortical: Extinction on DSS (neglect): none    Reflexes:              Biceps(C5)       BR(C6)     Triceps(C7)               Patellar(L4)    Achilles(S1)    Plantar Resp  R	2	          2	             2		        2		    2		Down   L	2	          2	             2		        2		    2		Down     Coordination: intact rapid-alt movements. No dysmetria to FTN/HTS    Gait: Normal Romberg. No postural instability. Normal stance and tandem gait.     LABORATORY:  CBC                       13.5   8.8   )-----------( 219      ( 11 Sep 2019 18:59 )             42.0     Chem -    140  |  104  |  19  ----------------------------<  102<H>  4.0   |  24  |  0.92    Ca    8.6      11 Sep 2019 18:59    TPro  6.2  /  Alb  3.5  /  TBili  0.3  /  DBili  x   /  AST  12  /  ALT  9<L>  /  AlkPhos  75  11    LFTs LIVER FUNCTIONS - ( 11 Sep 2019 18:59 )  Alb: 3.5 g/dL / Pro: 6.2 g/dL / ALK PHOS: 75 U/L / ALT: 9 U/L / AST: 12 U/L / GGT: x           Coagulopathy PT/INR - ( 11 Sep 2019 18:59 )   PT: 10.9 sec;   INR: 0.96 ratio         PTT - ( 11 Sep 2019 18:59 )  PTT:28.7 sec  Lipid Panel   A1c   Cardiac enzymes     U/A Urinalysis Basic - ( 11 Sep 2019 20:37 )    Color: Colorless / Appearance: Clear / S.008 / pH: x  Gluc: x / Ketone: Negative  / Bili: Negative / Urobili: Negative   Blood: x / Protein: Negative / Nitrite: Positive   Leuk Esterase: Moderate / RBC: 5 /hpf / WBC 7 /HPF   Sq Epi: x / Non Sq Epi: 1 /hpf / Bacteria: Many      CSF  Immunological  Other    STUDIES & IMAGING:  Studies (EKG, EEG, EMG, etc):     Radiology (XR, CT, MR, U/S, TTE/FELICIA):  < from: CT Head No Cont (19 @ 19:29) >  FINDINGS:      There is no acute intracranial mass-effect, hemorrhage, midline shift, or   abnormal extra-axial fluid collection.     Cerebral volumeloss is present with secondary proportional prominence of   the sulci and ventricles.    Chronic microvascular ischemic changes. Basal cisterns are patent.     Right sphenoid sinus mucosal thickening. The tympanomastoid cavities are   free of acute disease.. No calvarial fracture.    IMPRESSION:     No acute intracranial bleeding, mass effect, or shift.     < end of copied text > HPI:  83F with PMHx of severe dementia (likely Alzheimer's, fully dependent on ADLs and care, only says several words per day), HTN, and T2DM presents to [facility] for one day of seizure-like activity. Per reports from nursing home where the patient resides, she was observed to have generalized limb shaking with eyes rolling to the back of her head that may have lasted for several minutes. Shortly thereafter patient did not appear to be at her baseline which is responsive to verbal stimuli. Per daughter at bedside patient was at her baseline three days prior to admission at which time she can acknowledge verbal stimuli. Per daughter patient appears drowsy at this time (but is responsive to painful stimuli). Given patient's mentation unable to really elicit any complaints. No known history of fevers, shortness of breath, cough, dysuria, or diarrhea. There has been no new medications given or change in regimen. Nothing has been given over the counter nor anything for pain. This issue has not happened before. Per daughter, patient noted to have fine low amplitude shaking in lower extremity. In the ED neurology was called and patient given ceftriaxone for possible UTI in addition to 1 L NS. CT Head was unremarkable for any acute event. Goals of care discussion with daughter and ACP, see separate note. Patient admitted for possible seizure. (12 Sep 2019 00:14)    Neurology consulted:  History obtained from daughter Darcy ([phone]). Patient is right handed, The daughter received a call from NH because the patient was observed to have eyes rolling straight back, jerky movements in both the arms/legs/abdomen on  at around 3 pm. Patient never fully returned to baseline and was noted to be sleepy after the episode. Daughter last saw her on Monday at baseline (speaking 5 words)., No tongue biting noted. She is incontinent at baseline. She does not speak more than a word at a time. She has bilateral leg weakness at baseline (uses a wheelchair). She has never had any episodes like this before. She has not seen a neurologist in 3 years.  When evaluated for Alzheimer's, several old strokes were found on CT. Denies recent illness. No changes in appetite. Patient is on a soft mechanical diet at the NH. According to ED nurse, patient had an episode where her eyes rolled back and she had shaking movements in her extremities while in the ED.       REVIEW OF SYSTEMS    A 10-system ROS was performed and is negative except for those items noted above and/or in the HPI.    PAST MEDICAL & SURGICAL HISTORY:  Diabetes: type 2  HTN (hypertension)  Hyperlipidemia  Alzheimer's dementia  Status post open reduction with internal fixation (ORIF) of fracture of ankle    FAMILY HISTORY:  FH: diabetes mellitus    SOCIAL HISTORY:   T/E/D: denies  Occupation: homemaker/TA  Lives with: NH    MEDICATIONS (HOME):  Home Medications:  acetaminophen 325 mg oral tablet: 2 tab(s) orally every 6 hours, As needed, For Temp over 38.3 C (100.94 F) (12 Sep 2019 00:06)  amLODIPine 5 mg oral tablet: 1 tab(s) orally once a day (12 Sep 2019 00:06)  Effexor 75 mg oral tablet: 1 tab(s) orally once a day (12 Sep 2019 00:06)  furosemide 40 mg oral tablet: 1 tab(s) orally once a day (12 Sep 2019 00:06)  Januvia 25 mg oral tablet: 1 tab(s) orally once a day (12 Sep 2019 00:)  metFORMIN 500 mg oral tablet: 1 tab(s) orally 2 times a day (12 Sep 2019 00:06)  montelukast 10 mg oral tablet: 1 tab(s) orally once a day (12 Sep 2019 00:06)  Namenda 10 mg oral tablet: 1 tab(s) orally 2 times a day (12 Sep 2019 00:06)  simvastatin 20 mg oral tablet: 1 tab(s) orally once a day (at bedtime) (12 Sep 2019 00:06)    MEDICATIONS  (STANDING):  amLODIPine   Tablet 5 milliGRAM(s) Oral daily  cefTRIAXone   IVPB 1000 milliGRAM(s) IV Intermittent every 24 hours  dextrose 50% Injectable 12.5 Gram(s) IV Push once  dextrose 50% Injectable 25 Gram(s) IV Push once  dextrose 50% Injectable 25 Gram(s) IV Push once  enoxaparin Injectable 40 milliGRAM(s) SubCutaneous daily  insulin lispro (HumaLOG) corrective regimen sliding scale   SubCutaneous three times a day before meals  insulin lispro (HumaLOG) corrective regimen sliding scale   SubCutaneous at bedtime  lactated ringers. 1000 milliLiter(s) (70 mL/Hr) IV Continuous <Continuous>  simvastatin 20 milliGRAM(s) Oral at bedtime  venlafaxine 75 milliGRAM(s) Oral daily    MEDICATIONS  (PRN):  dextrose 40% Gel 15 Gram(s) Oral once PRN Blood Glucose LESS THAN 70 milliGRAM(s)/deciliter  glucagon  Injectable 1 milliGRAM(s) IntraMuscular once PRN Glucose LESS THAN 70 milligrams/deciliter    ALLERGIES/INTOLERANCES:  Allergies  No Known Allergies    Intolerances    VITALS & EXAMINATION:  Vital Signs Last 24 Hrs  T(C): 36.7 (12 Sep 2019 04:43), Max: 36.9 (12 Sep 2019 00:39)  T(F): 98.1 (12 Sep 2019 04:43), Max: 98.4 (12 Sep 2019 00:39)  HR: 73 (12 Sep 2019 04:43) (64 - 95)  BP: 153/83 (12 Sep 2019 04:43) (133/78 - 164/76)  BP(mean): --  RR: 18 (12 Sep 2019 04:43) (17 - 20)  SpO2: 96% (12 Sep 2019 04:43) (95% - 100%)    General: elderly Female, appears stated age, in no apparent distress including pain  Extremities: ecchymoses on b/l lower extremities    Neurological (>12):  MS: Awake, alert. Follows some simple commands (open/close eyes)    Language: No verbal output.    CNs: PERRL (R = 2mm, L = 2mm). VFF by BTT. EOMI no nystagmus. No facial asymmetry b/l.     Motor: Normal muscle bulk. Bilateral upper extremity rigidity greater on right than left. Cogwheel rigidity RUE>LUE.    RUE: antigravity  LUE: antigravity  RLE; 2/5 throughout  LLE: 2/5	throughout    Sensation: Intact to noxious stimuli b/l throughout.     Reflexes:              Biceps(C5)       BR(C6)     Triceps(C7)               Patellar(L4)    Achilles(S1)    Plantar Resp  R	2	          2	             2		        2		    2		Down   L	2	          2	             2		        2		    2		Down     Coordination: Patient did not participate    Gait: Patient unable to participate    LABORATORY:  CBC                       13.5   8.8   )-----------( 219      ( 11 Sep 2019 18:59 )             42.0     Chem 09-11    140  |  104  |  19  ----------------------------<  102<H>  4.0   |  24  |  0.92    Ca    8.6      11 Sep 2019 18:59    TPro  6.2  /  Alb  3.5  /  TBili  0.3  /  DBili  x   /  AST  12  /  ALT  9<L>  /  AlkPhos  75  09-11    LFTs LIVER FUNCTIONS - ( 11 Sep 2019 18:59 )  Alb: 3.5 g/dL / Pro: 6.2 g/dL / ALK PHOS: 75 U/L / ALT: 9 U/L / AST: 12 U/L / GGT: x           Coagulopathy PT/INR - ( 11 Sep 2019 18:59 )   PT: 10.9 sec;   INR: 0.96 ratio         PTT - ( 11 Sep 2019 18:59 )  PTT:28.7 sec  Lipid Panel   A1c   Cardiac enzymes     U/A Urinalysis Basic - ( 11 Sep 2019 20:37 )    Color: Colorless / Appearance: Clear / S.008 / pH: x  Gluc: x / Ketone: Negative  / Bili: Negative / Urobili: Negative   Blood: x / Protein: Negative / Nitrite: Positive   Leuk Esterase: Moderate / RBC: 5 /hpf / WBC 7 /HPF   Sq Epi: x / Non Sq Epi: 1 /hpf / Bacteria: Many      CSF  Immunological  Other    STUDIES & IMAGING:  Studies (EKG, EEG, EMG, etc):     Radiology (XR, CT, MR, U/S, TTE/FELICIA):  < from: CT Head No Cont (19 @ 19:29) >  FINDINGS:      There is no acute intracranial mass-effect, hemorrhage, midline shift, or   abnormal extra-axial fluid collection.     Cerebral volume loss is present with secondary proportional prominence of   the sulci and ventricles.    Chronic microvascular ischemic changes. Basal cisterns are patent.     Right sphenoid sinus mucosal thickening. The tympanomastoid cavities are   free of acute disease.. No calvarial fracture.    IMPRESSION:     No acute intracranial bleeding, mass effect, or shift.     < end of copied text > HPI:  83F with PMHx of severe dementia (likely Alzheimer's, baseline tremor, nonambulatory due to falls/unsteadiness, nonverbal, fully dependent on ADLs and care, only says several words per day), HTN, and T2DM presents to [facility]  from nursing home where the patient resides, she was observed to have generalized limb trembling/stiffening, shaking with eyes rolling to the back of her head intermittently, that may have lasted for several minutes. Shortly thereafter patient did not appear to be at her baseline which is responsive to verbal stimuli. Per daughter at bedside patient was at her baseline three days prior to admission at which time she can acknowledge verbal stimuli. Per daughter patient appears drowsy at this time (but is responsive to painful stimuli). Given patient's mentation unable to really elicit any complaints. No known history of fevers, shortness of breath, cough, dysuria, or diarrhea. There has been no new medications given or change in regimen. Nothing has been given over the counter nor anything for pain. This issue has not happened before. Per daughter, patient noted to have fine low amplitude shaking in lower extremity. In the ED neurology was called and patient given ceftriaxone for possible UTI in addition to 1 L NS. CT Head was unremarkable for any acute event. Goals of care discussion with daughter and ACP, see separate note. Patient admitted for possible seizure. (12 Sep 2019 00:14)    Neurology consulted:  History obtained from daughter Darcy ([phone]). Patient is right handed, The daughter received a call from NH because the patient was observed to have eyes rolling straight back, jerky movements in both the arms/legs/abdomen on  at around 3 pm. Patient never fully returned to baseline and was noted to be sleepy after the episode. Daughter last saw her on Monday at baseline (speaking 5 words)., No tongue biting noted. She is incontinent at baseline. She does not speak more than a word at a time. She has bilateral leg weakness at baseline (uses a wheelchair). She has never had any episodes like this before. She has not seen a neurologist in 3 years (vikas zambrano).  When evaluated for Alzheimer's, several old strokes were found on CT. Denies recent illness. No changes in appetite. Patient is on a soft mechanical diet at the NH. According to ED nurse, patient had an episode where her eyes rolled back and she had shaking movements in her extremities while in the ED.       REVIEW OF SYSTEMS    A 10-system ROS was performed and is negative except for those items noted above and/or in the HPI.    PAST MEDICAL & SURGICAL HISTORY:  Diabetes: type 2  HTN (hypertension)  Hyperlipidemia  Alzheimer's dementia  Status post open reduction with internal fixation (ORIF) of fracture of ankle    FAMILY HISTORY:  FH: diabetes mellitus    SOCIAL HISTORY:   T/E/D: denies  Occupation: homemaker/TA  Lives with: NH    MEDICATIONS (HOME):  Home Medications:  acetaminophen 325 mg oral tablet: 2 tab(s) orally every 6 hours, As needed, For Temp over 38.3 C (100.94 F) (12 Sep 2019 00:06)  amLODIPine 5 mg oral tablet: 1 tab(s) orally once a day (12 Sep 2019 00:)  Effexor 75 mg oral tablet: 1 tab(s) orally once a day (12 Sep 2019 00:06)  furosemide 40 mg oral tablet: 1 tab(s) orally once a day (12 Sep 2019 00:)  Januvia 25 mg oral tablet: 1 tab(s) orally once a day (12 Sep 2019 00:06)  metFORMIN 500 mg oral tablet: 1 tab(s) orally 2 times a day (12 Sep 2019 00:06)  montelukast 10 mg oral tablet: 1 tab(s) orally once a day (12 Sep 2019 00:06)  Namenda 10 mg oral tablet: 1 tab(s) orally 2 times a day (12 Sep 2019 00:06)  simvastatin 20 mg oral tablet: 1 tab(s) orally once a day (at bedtime) (12 Sep 2019 00:)    MEDICATIONS  (STANDING):  amLODIPine   Tablet 5 milliGRAM(s) Oral daily  cefTRIAXone   IVPB 1000 milliGRAM(s) IV Intermittent every 24 hours  dextrose 50% Injectable 12.5 Gram(s) IV Push once  dextrose 50% Injectable 25 Gram(s) IV Push once  dextrose 50% Injectable 25 Gram(s) IV Push once  enoxaparin Injectable 40 milliGRAM(s) SubCutaneous daily  insulin lispro (HumaLOG) corrective regimen sliding scale   SubCutaneous three times a day before meals  insulin lispro (HumaLOG) corrective regimen sliding scale   SubCutaneous at bedtime  lactated ringers. 1000 milliLiter(s) (70 mL/Hr) IV Continuous <Continuous>  simvastatin 20 milliGRAM(s) Oral at bedtime  venlafaxine 75 milliGRAM(s) Oral daily    MEDICATIONS  (PRN):  dextrose 40% Gel 15 Gram(s) Oral once PRN Blood Glucose LESS THAN 70 milliGRAM(s)/deciliter  glucagon  Injectable 1 milliGRAM(s) IntraMuscular once PRN Glucose LESS THAN 70 milligrams/deciliter    ALLERGIES/INTOLERANCES:  Allergies  No Known Allergies    Intolerances    VITALS & EXAMINATION:  Vital Signs Last 24 Hrs  T(C): 36.7 (12 Sep 2019 04:43), Max: 36.9 (12 Sep 2019 00:39)  T(F): 98.1 (12 Sep 2019 04:43), Max: 98.4 (12 Sep 2019 00:39)  HR: 73 (12 Sep 2019 04:43) (64 - 95)  BP: 153/83 (12 Sep 2019 04:43) (133/78 - 164/76)  BP(mean): --  RR: 18 (12 Sep 2019 04:43) (17 - 20)  SpO2: 96% (12 Sep 2019 04:43) (95% - 100%)    General: elderly Female, appears stated age, in no apparent distress including pain  Extremities: ecchymoses on b/l lower extremities    Neurological (>12):  MS: Awake, alert. Follows some simple commands (open/close eyes)    Language: No verbal output.    CNs: PERRL (R = 2mm, L = 2mm). VFF by BTT. EOMI no nystagmus. No facial asymmetry b/l.     Motor: Normal muscle bulk. Bilateral upper extremity rigidity greater on right than left. Cogwheel rigidity RUE>LUE.    RUE: antigravity  LUE: antigravity  RLE; 2/5 throughout  LLE: 2/5	throughout    Sensation: Intact to noxious stimuli b/l throughout.     Reflexes:              Biceps(C5)       BR(C6)     Triceps(C7)               Patellar(L4)    Achilles(S1)    Plantar Resp  R	2	          2	             2		        2		    2		Down   L	2	          2	             2		        2		    2		Down     Coordination: Patient did not participate    Gait: Patient unable to participate    LABORATORY:  CBC                       13.5   8.8   )-----------( 219      ( 11 Sep 2019 18:59 )             42.0     Chem 09-11    140  |  104  |  19  ----------------------------<  102<H>  4.0   |  24  |  0.92    Ca    8.6      11 Sep 2019 18:59    TPro  6.2  /  Alb  3.5  /  TBili  0.3  /  DBili  x   /  AST  12  /  ALT  9<L>  /  AlkPhos  75  09-11    LFTs LIVER FUNCTIONS - ( 11 Sep 2019 18:59 )  Alb: 3.5 g/dL / Pro: 6.2 g/dL / ALK PHOS: 75 U/L / ALT: 9 U/L / AST: 12 U/L / GGT: x           Coagulopathy PT/INR - ( 11 Sep 2019 18:59 )   PT: 10.9 sec;   INR: 0.96 ratio         PTT - ( 11 Sep 2019 18:59 )  PTT:28.7 sec  Lipid Panel   A1c   Cardiac enzymes     U/A Urinalysis Basic - ( 11 Sep 2019 20:37 )    Color: Colorless / Appearance: Clear / S.008 / pH: x  Gluc: x / Ketone: Negative  / Bili: Negative / Urobili: Negative   Blood: x / Protein: Negative / Nitrite: Positive   Leuk Esterase: Moderate / RBC: 5 /hpf / WBC 7 /HPF   Sq Epi: x / Non Sq Epi: 1 /hpf / Bacteria: Many      CSF  Immunological  Other    STUDIES & IMAGING:  Studies (EKG, EEG, EMG, etc):     Radiology (XR, CT, MR, U/S, TTE/FELICIA):  < from: CT Head No Cont (19 @ 19:29) >  FINDINGS:      There is no acute intracranial mass-effect, hemorrhage, midline shift, or   abnormal extra-axial fluid collection.     Cerebral volume loss is present with secondary proportional prominence of   the sulci and ventricles.    Chronic microvascular ischemic changes. Basal cisterns are patent.     Right sphenoid sinus mucosal thickening. The tympanomastoid cavities are   free of acute disease.. No calvarial fracture.    IMPRESSION:     No acute intracranial bleeding, mass effect, or shift.     < end of copied text >

## 2019-09-12 NOTE — PATIENT PROFILE ADULT - NSASFALLNEEDSASSIST_GEN_A_NUR
Subjective  No new complaints  Tolerating diet and having BM      Temp:  [97.6 °F (36.4 °C)-98.9 °F (37.2 °C)]   Pulse (Heart Rate):  [72-91]   BP: (110-152)/(60-90)   Resp Rate:  [16-22]   O2 Sat (%):  [95 %-99 %]   Weight:  [197 lb 15.6 oz (89.8 kg)-215 lb (97.5 kg)]   10/15 0701 - 10/15 1900  In: 101.7 [I.V.:101.7]  Out: 450 [Urine:400; Drains:50]  10/13 1901 - 10/15 0700  In: 2510.4 [P.O.:1440; I.V.:1070.4]  Out: 5100 [Urine:4750; Drains:350]      Objective  Gen- Alert in NAD  Lungs- CTA  H-RRR  Abd- S/nt. nd  Mild erythema In the midportion of the wound without drainage    Recent Results (from the past 24 hour(s))   CBC WITH AUTOMATED DIFF    Collection Time: 10/15/17  4:10 AM   Result Value Ref Range    WBC 17.4 (H) 4.1 - 11.1 K/uL    RBC 3.15 (L) 4.10 - 5.70 M/uL    HGB 9.6 (L) 12.1 - 17.0 g/dL    HCT 28.9 (L) 36.6 - 50.3 %    MCV 91.7 80.0 - 99.0 FL    MCH 30.5 26.0 - 34.0 PG    MCHC 33.2 30.0 - 36.5 g/dL    RDW 15.3 (H) 11.5 - 14.5 %    PLATELET 291 118 - 382 K/uL    NEUTROPHILS 74 32 - 75 %    BAND NEUTROPHILS 2 0 - 6 %    LYMPHOCYTES 1 (L) 12 - 49 %    MONOCYTES 4 (L) 5 - 13 %    EOSINOPHILS 2 0 - 7 %    BASOPHILS 1 0 - 1 %    METAMYELOCYTES 7 (H) 0 %    MYELOCYTES 9 (H) 0 %    ABS. NEUTROPHILS 13.2 (H) 1.8 - 8.0 K/UL    ABS. LYMPHOCYTES 0.2 (L) 0.8 - 3.5 K/UL    ABS. MONOCYTES 0.7 0.0 - 1.0 K/UL    ABS. EOSINOPHILS 0.3 0.0 - 0.4 K/UL    ABS.  BASOPHILS 0.2 (H) 0.0 - 0.1 K/UL    DF MANUAL      RBC COMMENTS ANISOCYTOSIS  1+       METABOLIC PANEL, COMPREHENSIVE    Collection Time: 10/15/17  4:10 AM   Result Value Ref Range    Sodium 137 136 - 145 mmol/L    Potassium 3.5 3.5 - 5.1 mmol/L    Chloride 100 97 - 108 mmol/L    CO2 28 21 - 32 mmol/L    Anion gap 9 5 - 15 mmol/L    Glucose 129 (H) 65 - 100 mg/dL    BUN 10 6 - 20 MG/DL    Creatinine 0.58 (L) 0.70 - 1.30 MG/DL    BUN/Creatinine ratio 17 12 - 20      GFR est AA >60 >60 ml/min/1.73m2    GFR est non-AA >60 >60 ml/min/1.73m2    Calcium 7.9 (L) 8.5 - 10.1 MG/DL    Bilirubin, total 0.4 0.2 - 1.0 MG/DL    ALT (SGPT) 25 12 - 78 U/L    AST (SGOT) 11 (L) 15 - 37 U/L    Alk. 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 Would like to see this come down prior to discharge. Continue GI lite diet. yes

## 2019-09-12 NOTE — H&P ADULT - PROBLEM SELECTOR PLAN 1
-Need to rule out seizure so continue with seizure and aspiration precaution, NPO for now until mentation improves  -Prolactin Level, TSH, and RPR  -Prolonged EEG ordered  -Follow up neurology recommendations  -Defer HIV screening (no risk factors) and B12 level (no anemia or macrocytosis)  -Hold lasix and given gentle hydration for possible convulsive syncope -Need to rule out seizure so continue with seizure and aspiration precaution, NPO for now until mentation improves  -Prolactin Level, TSH, and RPR  -UTOX  -Prolonged EEG ordered  -Follow up neurology recommendations  -Defer HIV screening (no risk factors) and B12 level (no anemia or macrocytosis)  -Hold lasix and given gentle hydration for possible convulsive syncope

## 2019-09-12 NOTE — H&P ADULT - PROBLEM SELECTOR PLAN 6
-Continue with Effexor, but with caution  -Avoid other QT prolonging medications  -Don't suspect cardiac arrhythmia as cause of seizure-like activty

## 2019-09-12 NOTE — H&P ADULT - HISTORY OF PRESENT ILLNESS
83F with PMHx of severe dementia (likely Alzheimer's, fully dependent on ADLs and care, only says several words per day), HTN, and T2DM presents to [facility] for one day of seizure-like activity. Per reports from nursing home where the patient resides, she was observed to have generalized limb shaking with eyes rolling to the back of her head that may have lasted for several minutes. Shortly thereafter patient did not appear to be at her baseline which is responsive to verbal stimuli. Per daughter at bedside patient was at her baseline three days prior to admission at which time she can acknowledge verbal stimuli. Per daughter patient appears drowsy at this time (but is responsive to painful stimuli). Given patient's mentation unable to really elicit any complaints. No known history of fevers, shortness of breath, cough, dysuria, or diarrhea. There has been no new medications given or change in regimen. Nothing has been given over the counter nor anything for pain. This issue has not happened before. Per daughter, patient noted to have fine low amplitude shaking in lower extremity. In the ED neurology was called and patient given ceftriaxone for possible UTI in addition to 1 L NS. CT Head was unremarkable for any acute event. Goals of care discussion with daughter and ACP, see separate note. Patient admitted for possible seizure.

## 2019-09-12 NOTE — CONSULT NOTE ADULT - ATTENDING COMMENTS
pt seen 9/12/19 2PM  pt with episode of worsened AMS "all day" with increased tremulousness/stiffening reported.  unclear to me if these epochs represented seizure by description, though it was labeled so per nursing home.  pt with dementia, tremulousness, nonverbal, lethargy intermittently at baseline.    Rec VEEG x 24hrs  treatment of UTI  unclear if AED indicated by history.  ok to cont effexor and namenda for now (low risk of sz, and on for several years)

## 2019-09-12 NOTE — PROGRESS NOTE ADULT - SUBJECTIVE AND OBJECTIVE BOX
Patient is a 83y old  Female who presents with a chief complaint of Seizure Like Activity (12 Sep 2019 05:25)      SUBJECTIVE / OVERNIGHT EVENTS:    MEDICATIONS  (STANDING):  amLODIPine   Tablet 5 milliGRAM(s) Oral daily  cefTRIAXone   IVPB 1000 milliGRAM(s) IV Intermittent every 24 hours  dextrose 50% Injectable 12.5 Gram(s) IV Push once  dextrose 50% Injectable 25 Gram(s) IV Push once  dextrose 50% Injectable 25 Gram(s) IV Push once  enoxaparin Injectable 40 milliGRAM(s) SubCutaneous daily  insulin lispro (HumaLOG) corrective regimen sliding scale   SubCutaneous three times a day before meals  insulin lispro (HumaLOG) corrective regimen sliding scale   SubCutaneous at bedtime  lactated ringers. 1000 milliLiter(s) (70 mL/Hr) IV Continuous <Continuous>  simvastatin 20 milliGRAM(s) Oral at bedtime  venlafaxine 75 milliGRAM(s) Oral daily    MEDICATIONS  (PRN):  dextrose 40% Gel 15 Gram(s) Oral once PRN Blood Glucose LESS THAN 70 milliGRAM(s)/deciliter  glucagon  Injectable 1 milliGRAM(s) IntraMuscular once PRN Glucose LESS THAN 70 milligrams/deciliter      Vital Signs Last 24 Hrs  T(C): 36.5 (12 Sep 2019 08:05), Max: 36.9 (12 Sep 2019 00:39)  T(F): 97.7 (12 Sep 2019 08:05), Max: 98.4 (12 Sep 2019 00:39)  HR: 65 (12 Sep 2019 08:05) (64 - 95)  BP: 129/76 (12 Sep 2019 08:05) (129/76 - 164/76)  BP(mean): --  RR: 18 (12 Sep 2019 08:05) (17 - 20)  SpO2: 94% (12 Sep 2019 08:05) (94% - 100%)  CAPILLARY BLOOD GLUCOSE      POCT Blood Glucose.: 80 mg/dL (12 Sep 2019 09:47)  POCT Blood Glucose.: 98 mg/dL (12 Sep 2019 00:33)  POCT Blood Glucose.: 89 mg/dL (11 Sep 2019 18:42)    I&O's Summary      PHYSICAL EXAM:  GENERAL: NAD, well-developed  HEAD:  Atraumatic, Normocephalic  EYES: EOMI, PERRLA, conjunctiva and sclera clear  NECK: Supple, No JVD  CHEST/LUNG: Clear to auscultation bilaterally; No wheeze  HEART: Regular rate and rhythm; No murmurs, rubs, or gallops  ABDOMEN: Soft, Nontender, Nondistended; Bowel sounds present  EXTREMITIES:  2+ Peripheral Pulses, No clubbing, cyanosis, or edema  PSYCH: AAOx3  NEUROLOGY: non-focal  SKIN: No rashes or lesions    LABS:                        12.7   7.93  )-----------( 196      ( 12 Sep 2019 09:02 )             40.2         143  |  107  |  14  ----------------------------<  87  4.4   |  23  |  0.81    Ca    8.4      12 Sep 2019 04:46  Mg     2.3         TPro  6.2  /  Alb  3.5  /  TBili  0.3  /  DBili  x   /  AST  12  /  ALT  9<L>  /  AlkPhos  75  -    PT/INR - ( 11 Sep 2019 18:59 )   PT: 10.9 sec;   INR: 0.96 ratio         PTT - ( 11 Sep 2019 18:59 )  PTT:28.7 sec      Urinalysis Basic - ( 11 Sep 2019 20:37 )    Color: Colorless / Appearance: Clear / S.008 / pH: x  Gluc: x / Ketone: Negative  / Bili: Negative / Urobili: Negative   Blood: x / Protein: Negative / Nitrite: Positive   Leuk Esterase: Moderate / RBC: 5 /hpf / WBC 7 /HPF   Sq Epi: x / Non Sq Epi: 1 /hpf / Bacteria: Many        RADIOLOGY & ADDITIONAL TESTS:    Imaging Personally Reviewed:    Consultant(s) Notes Reviewed:      Care Discussed with Consultants/Other Providers: Patient is a 83y old  Female who presents with a chief complaint of Seizure Like Activity (12 Sep 2019 05:25)      SUBJECTIVE / OVERNIGHT EVENTS:  Pt seen and examined with family at bedside. Per daughter pt is back to baseline mental status. No seizure like acitvity noted since presentation to ED.  Unable to obtain ROS on account of dementia. Seen by Neurology this am; reccs appreciated.    MEDICATIONS  (STANDING):  amLODIPine   Tablet 5 milliGRAM(s) Oral daily  cefTRIAXone   IVPB 1000 milliGRAM(s) IV Intermittent every 24 hours  dextrose 50% Injectable 12.5 Gram(s) IV Push once  dextrose 50% Injectable 25 Gram(s) IV Push once  dextrose 50% Injectable 25 Gram(s) IV Push once  enoxaparin Injectable 40 milliGRAM(s) SubCutaneous daily  insulin lispro (HumaLOG) corrective regimen sliding scale   SubCutaneous three times a day before meals  insulin lispro (HumaLOG) corrective regimen sliding scale   SubCutaneous at bedtime  lactated ringers. 1000 milliLiter(s) (70 mL/Hr) IV Continuous <Continuous>  simvastatin 20 milliGRAM(s) Oral at bedtime  venlafaxine 75 milliGRAM(s) Oral daily    MEDICATIONS  (PRN):  dextrose 40% Gel 15 Gram(s) Oral once PRN Blood Glucose LESS THAN 70 milliGRAM(s)/deciliter  glucagon  Injectable 1 milliGRAM(s) IntraMuscular once PRN Glucose LESS THAN 70 milligrams/deciliter      Vital Signs Last 24 Hrs  T(C): 36.5 (12 Sep 2019 08:05), Max: 36.9 (12 Sep 2019 00:39)  T(F): 97.7 (12 Sep 2019 08:05), Max: 98.4 (12 Sep 2019 00:39)  HR: 65 (12 Sep 2019 08:05) (64 - 95)  BP: 129/76 (12 Sep 2019 08:05) (129/76 - 164/76)  BP(mean): --  RR: 18 (12 Sep 2019 08:05) (17 - 20)  SpO2: 94% (12 Sep 2019 08:05) (94% - 100%)  CAPILLARY BLOOD GLUCOSE      POCT Blood Glucose.: 80 mg/dL (12 Sep 2019 09:47)  POCT Blood Glucose.: 98 mg/dL (12 Sep 2019 00:33)  POCT Blood Glucose.: 89 mg/dL (11 Sep 2019 18:42)    I&O's Summary      PHYSICAL EXAM:  GENERAL: NAD, well-developed  HEAD:  Atraumatic, Normocephalic  EYES: EOMI, PERRLA, conjunctiva and sclera clear  NECK: Supple, No JVD  CHEST/LUNG: Clear to auscultation bilaterally; No wheeze  HEART: Regular rate and rhythm; No murmurs, rubs, or gallops  ABDOMEN: Soft, Nontender, Nondistended; Bowel sounds present  EXTREMITIES:  2+ Peripheral Pulses, No clubbing, cyanosis, or edema  PSYCH: Awake, alert. Follows some simple commands (open/close eyes); non verbal  NEUROLOGY:  Normal muscle bulk. Bilateral upper extremity rigidity greater on right than left. Cogwheel rigidity RUE>LUE.    Power 2/5 UE/LE b/  SKIN: No rashes or lesions    LABS:                        12.7   7.93  )-----------( 196      ( 12 Sep 2019 09:02 )             40.2     -    143  |  107  |  14  ----------------------------<  87  4.4   |  23  |  0.81    Ca    8.4      12 Sep 2019 04:46  Mg     2.3     -    TPro  6.2  /  Alb  3.5  /  TBili  0.3  /  DBili  x   /  AST  12  /  ALT  9<L>  /  AlkPhos  75  -    PT/INR - ( 11 Sep 2019 18:59 )   PT: 10.9 sec;   INR: 0.96 ratio         PTT - ( 11 Sep 2019 18:59 )  PTT:28.7 sec      Urinalysis Basic - ( 11 Sep 2019 20:37 )    Color: Colorless / Appearance: Clear / S.008 / pH: x  Gluc: x / Ketone: Negative  / Bili: Negative / Urobili: Negative   Blood: x / Protein: Negative / Nitrite: Positive   Leuk Esterase: Moderate / RBC: 5 /hpf / WBC 7 /HPF   Sq Epi: x / Non Sq Epi: 1 /hpf / Bacteria: Many          Consultant(s) Notes Reviewed:  Neurology Patient is a 83y old  Female who presents with a chief complaint of Seizure Like Activity (12 Sep 2019 05:25)      SUBJECTIVE / OVERNIGHT EVENTS:  Pt seen and examined with family at bedside. Per daughter pt is back to baseline mental status. No seizure like acitvity noted since presentation to ED.  Unable to obtain ROS on account of dementia. Seen by Neurology this am; reccs appreciated.    MEDICATIONS  (STANDING):  amLODIPine   Tablet 5 milliGRAM(s) Oral daily  cefTRIAXone   IVPB 1000 milliGRAM(s) IV Intermittent every 24 hours  dextrose 50% Injectable 12.5 Gram(s) IV Push once  dextrose 50% Injectable 25 Gram(s) IV Push once  dextrose 50% Injectable 25 Gram(s) IV Push once  enoxaparin Injectable 40 milliGRAM(s) SubCutaneous daily  insulin lispro (HumaLOG) corrective regimen sliding scale   SubCutaneous three times a day before meals  insulin lispro (HumaLOG) corrective regimen sliding scale   SubCutaneous at bedtime  lactated ringers. 1000 milliLiter(s) (70 mL/Hr) IV Continuous <Continuous>  simvastatin 20 milliGRAM(s) Oral at bedtime  venlafaxine 75 milliGRAM(s) Oral daily    MEDICATIONS  (PRN):  dextrose 40% Gel 15 Gram(s) Oral once PRN Blood Glucose LESS THAN 70 milliGRAM(s)/deciliter  glucagon  Injectable 1 milliGRAM(s) IntraMuscular once PRN Glucose LESS THAN 70 milligrams/deciliter      Vital Signs Last 24 Hrs  T(C): 36.5 (12 Sep 2019 08:05), Max: 36.9 (12 Sep 2019 00:39)  T(F): 97.7 (12 Sep 2019 08:05), Max: 98.4 (12 Sep 2019 00:39)  HR: 65 (12 Sep 2019 08:05) (64 - 95)  BP: 129/76 (12 Sep 2019 08:05) (129/76 - 164/76)  BP(mean): --  RR: 18 (12 Sep 2019 08:05) (17 - 20)  SpO2: 94% (12 Sep 2019 08:05) (94% - 100%)  CAPILLARY BLOOD GLUCOSE      POCT Blood Glucose.: 80 mg/dL (12 Sep 2019 09:47)  POCT Blood Glucose.: 98 mg/dL (12 Sep 2019 00:33)  POCT Blood Glucose.: 89 mg/dL (11 Sep 2019 18:42)    I&O's Summary      PHYSICAL EXAM:  GENERAL: NAD, anicteric, afebrile  HEAD:  Atraumatic, Normocephalic  EYES: EOMI, PERRLA, conjunctiva and sclera clear  NECK: Supple, No JVD  CHEST/LUNG: Clear to auscultation bilaterally; No wheeze  HEART: Regular rate and rhythm; No murmurs, rubs, or gallops  ABDOMEN: Soft, Nontender, Nondistended; Bowel sounds present  EXTREMITIES:  2+ Peripheral Pulses, No clubbing, cyanosis, or edema  PSYCH: Awake, alert. Follows some simple commands (open/close eyes); non verbal  NEUROLOGY:  Normal muscle bulk. Bilateral upper extremity rigidity greater on right than left. Cogwheel rigidity RUE>LUE.    Power 2/5 UE/LE b/  SKIN: No rashes or lesions    LABS:                        12.7   7.93  )-----------( 196      ( 12 Sep 2019 09:02 )             40.2         143  |  107  |  14  ----------------------------<  87  4.4   |  23  |  0.81    Ca    8.4      12 Sep 2019 04:46  Mg     2.3         TPro  6.2  /  Alb  3.5  /  TBili  0.3  /  DBili  x   /  AST  12  /  ALT  9<L>  /  AlkPhos  75      PT/INR - ( 11 Sep 2019 18:59 )   PT: 10.9 sec;   INR: 0.96 ratio         PTT - ( 11 Sep 2019 18:59 )  PTT:28.7 sec      Urinalysis Basic - ( 11 Sep 2019 20:37 )    Color: Colorless / Appearance: Clear / S.008 / pH: x  Gluc: x / Ketone: Negative  / Bili: Negative / Urobili: Negative   Blood: x / Protein: Negative / Nitrite: Positive   Leuk Esterase: Moderate / RBC: 5 /hpf / WBC 7 /HPF   Sq Epi: x / Non Sq Epi: 1 /hpf / Bacteria: Many          Consultant(s) Notes Reviewed:  Neurology

## 2019-09-12 NOTE — GOALS OF CARE CONVERSATION - PERSONAL ADVANCE DIRECTIVE - CONVERSATION DETAILS
83F with PMHx of severe dementia, T2DM, and HTN presents for seizure like activity. Patient minimally verbal, but usually speaks several words per day and is responsive to verbal stimuli. At this time patient seen snoring (but protecting her airway) and withdrawing to painful stimuli. Patient's daughter and  at bedside. Patient has been living in [facility] for several years after suffering an ankle fracture. Patient fully dependent on others for care. She cannot perform any ADLs. Her  is NOK who would make decisions along with his daughter. Currently, I suspect there is something reversible causing the patient's acute change. Overall prognosis remains guarded given patient's functional status which puts her at risk for future infections.

## 2019-09-12 NOTE — H&P ADULT - PROBLEM SELECTOR PLAN 2
-Although may be asymptomatic, given patient's dementia at this point giving a third generation cephalosporin for possible UTI likely is more beneficial to prevent decompensation than harmful  -Ceftriaxone 1 g daily  -Follow up blood cultures

## 2019-09-12 NOTE — H&P ADULT - NSICDXPASTSURGICALHX_GEN_ALL_CORE_FT
PAST SURGICAL HISTORY:  Status post open reduction with internal fixation (ORIF) of fracture of ankle

## 2019-09-12 NOTE — H&P ADULT - NSICDXPASTMEDICALHX_GEN_ALL_CORE_FT
PAST MEDICAL HISTORY:  Alzheimer's dementia     Diabetes type 2    HTN (hypertension)     Hyperlipidemia

## 2019-09-13 ENCOUNTER — TRANSCRIPTION ENCOUNTER (OUTPATIENT)
Age: 84
End: 2019-09-13

## 2019-09-13 PROCEDURE — 95951: CPT | Mod: 26

## 2019-09-13 PROCEDURE — 99232 SBSQ HOSP IP/OBS MODERATE 35: CPT

## 2019-09-13 RX ORDER — FUROSEMIDE 40 MG
1 TABLET ORAL
Qty: 0 | Refills: 0 | DISCHARGE

## 2019-09-13 RX ORDER — MEMANTINE HYDROCHLORIDE 10 MG/1
10 TABLET ORAL
Refills: 0 | Status: DISCONTINUED | OUTPATIENT
Start: 2019-09-13 | End: 2019-09-14

## 2019-09-13 RX ADMIN — SIMVASTATIN 20 MILLIGRAM(S): 20 TABLET, FILM COATED ORAL at 21:10

## 2019-09-13 RX ADMIN — ENOXAPARIN SODIUM 40 MILLIGRAM(S): 100 INJECTION SUBCUTANEOUS at 11:43

## 2019-09-13 RX ADMIN — Medication 75 MILLIGRAM(S): at 21:10

## 2019-09-13 RX ADMIN — CEFTRIAXONE 100 MILLIGRAM(S): 500 INJECTION, POWDER, FOR SOLUTION INTRAMUSCULAR; INTRAVENOUS at 19:47

## 2019-09-13 RX ADMIN — MEMANTINE HYDROCHLORIDE 10 MILLIGRAM(S): 10 TABLET ORAL at 18:11

## 2019-09-13 RX ADMIN — AMLODIPINE BESYLATE 5 MILLIGRAM(S): 2.5 TABLET ORAL at 05:33

## 2019-09-13 NOTE — DISCHARGE NOTE PROVIDER - NSDCCPCAREPLAN_GEN_ALL_CORE_FT
PRINCIPAL DISCHARGE DIAGNOSIS  Diagnosis: Seizure  Assessment and Plan of Treatment:       SECONDARY DISCHARGE DIAGNOSES  Diagnosis: HTN (hypertension)  Assessment and Plan of Treatment: Low salt diet  Activity as tolerated.  Take all medication as prescribed.  Follow up with your medical doctor for routine blood pressure monitoring at your next visit.  Notify your doctor if you have any of the following symptoms:   Dizziness, Lightheadedness, Blurry vision, Headache, Chest pain, Shortness of breath      Diagnosis: Diabetes  Assessment and Plan of Treatment: HgA1C this admission.  Make sure you get your HgA1c checked every three months.  If you take oral diabetes medications, check your blood glucose two times a day.  If you take insulin, check your blood glucose before meals and at bedtime.  It's important not to skip any meals.  Keep a log of your blood glucose results and always take it with you to your doctor appointments.  Keep a list of your current medications including injectables and over the counter medications and bring this medication list with you to all your doctor appointments.  If you have not seen your ophthalmologist this year call for appointment.  Check your feet daily for redness, sores, or openings. Do not self treat. If no improvement in two days call your primary care physician for an appointment.  Low blood sugar (hypoglycemia) is a blood sugar below 70mg/dl. Check your blood sugar if you feel signs/symptoms of hypoglycemia. If your blood sugar is below 70 take 15 grams of carbohydrates (ex 4 oz of apple juice, 3-4 glucose tablets, or 4-6 oz of regular soda) wait 15 minutes and repeat blood sugar to make sure it comes up above 70.  If your blood sugar is above 70 and you are due for a meal, have a meal.  If you are not due for a meal have a snack.  This snack helps keeps your blood sugar at a safe range.      Diagnosis: UTI (urinary tract infection)  Assessment and Plan of Treatment: HOME CARE INSTRUCTIONS  f you were prescribed antibiotics, take them exactly as your caregiver instructs you. Finish the medication even if you feel better after you have only taken some of the medication.  Drink enough water and fluids to keep your urine clear or pale yellow.  Avoid caffeine, tea, and carbonated beverages. They tend to irritate your bladder.  Empty your bladder often. Avoid holding urine for long periods of time.  Empty your bladder before and after sexual intercourse.  After a bowel movement, women should cleanse from front to back. Use each tissue only once.  SEEK MEDICAL CARE IF:  You have back pain.  You develop a fever.  Your symptoms do not begin to resolve within 3 days.  SEEK IMMEDIATE MEDICAL CARE IF:  You have severe back pain or lower abdominal pain.  You develop chills.  You have nausea or vomiting.  You have continued burning or discomfort with urination. PRINCIPAL DISCHARGE DIAGNOSIS  Diagnosis: Seizure  Assessment and Plan of Treatment: Pt is at baseline as per her daughter. On video EEG- no seizures, MRI not needed.Please follow up with your PCP      SECONDARY DISCHARGE DIAGNOSES  Diagnosis: UTI (urinary tract infection)  Assessment and Plan of Treatment: HOME CARE INSTRUCTIONS  f you were prescribed antibiotics, take them exactly as your caregiver instructs you. Finish the medication even if you feel better after you have only taken some of the medication.  Drink enough water and fluids to keep your urine clear or pale yellow.  Avoid caffeine, tea, and carbonated beverages. They tend to irritate your bladder.  Empty your bladder often. Avoid holding urine for long periods of time.  Empty your bladder before and after sexual intercourse.  After a bowel movement, women should cleanse from front to back. Use each tissue only once.  SEEK MEDICAL CARE IF:  You have back pain.  You develop a fever.  Your symptoms do not begin to resolve within 3 days.  SEEK IMMEDIATE MEDICAL CARE IF:  You have severe back pain or lower abdominal pain.  You develop chills.  You have nausea or vomiting.  You have continued burning or discomfort with urination.      Diagnosis: HTN (hypertension)  Assessment and Plan of Treatment: Low salt diet  Activity as tolerated.  Take all medication as prescribed.  Follow up with your medical doctor for routine blood pressure monitoring at your next visit.  Notify your doctor if you have any of the following symptoms:   Dizziness, Lightheadedness, Blurry vision, Headache, Chest pain, Shortness of breath      Diagnosis: Diabetes  Assessment and Plan of Treatment:   Make sure you get your HgA1c checked every three months.  If you take oral diabetes medications, check your blood glucose two times a day.  If you take insulin, check your blood glucose before meals and at bedtime.  It's important not to skip any meals.  Keep a log of your blood glucose results and always take it with you to your doctor appointments.  Keep a list of your current medications including injectables and over the counter medications and bring this medication list with you to all your doctor appointments.  If you have not seen your ophthalmologist this year call for appointment.  Check your feet daily for redness, sores, or openings. Do not self treat. If no improvement in two days call your primary care physician for an appointment.  Low blood sugar (hypoglycemia) is a blood sugar below 70mg/dl. Check your blood sugar if you feel signs/symptoms of hypoglycemia. If your blood sugar is below 70 take 15 grams of carbohydrates (ex 4 oz of apple juice, 3-4 glucose tablets, or 4-6 oz of regular soda) wait 15 minutes and repeat blood sugar to make sure it comes up above 70.  If your blood sugar is above 70 and you are due for a meal, have a meal.  If you are not due for a meal have a snack.  This snack helps keeps your blood sugar at a safe range.      Diagnosis: Prolonged QT interval  Assessment and Plan of Treatment: Continue with Effexor, but with caution      Diagnosis: Alzheimer's dementia  Assessment and Plan of Treatment: Dementia causes memory problems and make it hard to think clear. The symptoms of dementia often start off very mild and get worse slowly. Symptoms are forgetfulness, confusion, trouble with language, getting lost in familiar places. As dementia gets worse, it can cause anger or aggression, see things that aren't there, decreased ability to eat, bathe, dress, or do other everyday tasks, or cause people to lose bladder and bowel control. Alzheimer disease, there are medicines that might help some. If you have vascular dementia, your doctor will focus on keeping your blood pressure and cholesterol as normal as possible. Sadly, there really aren't good treatments for most types of dementia.  Prevent falls and accidents by securing loose rugs or use non-skid rugs, loose wires or electrical cords. Wear sturdy, comfortable shoes, keep walkways well lit. There are no proven ways to prevent dementia. But encourage physical activity, healthy diet and social interaction to help keep the brain healthy. Keep medications, sharp knives, lighters, matches, power tools, and guns out of reach. Lower the temperature on water heaters. Keep familiar objects and people around. Use reminders, notes, or directions for daily activities or tasks. Keep a simple, consistent routine for waking, meals, bathing, dressing, and bedtime. Display emergency numbers and home address near all telephones.   SEEK MEDICAL CARE IF: New behavioral problems start such as moodiness, aggressiveness, or seeing things that are not there (hallucinations). Any new problem with brain function happens. This includes problems with balance, speech, swallowing difficulty or falling a lot. Small changes or worsening in any aspect of brain function can be a sign that the illness is getting worse or a sign of infection. Seeing a caregiver right away is important.   SEEK IMMEDIATE MEDICAL CARE IF: New or worsened confusion, sleepiness, or inability to sleep develops.

## 2019-09-13 NOTE — PROGRESS NOTE ADULT - SUBJECTIVE AND OBJECTIVE BOX
Patient is a 83y old  Female who presents with a chief complaint of Seizure Like Activity (12 Sep 2019 10:52)      SUBJECTIVE / OVERNIGHT EVENTS:    MEDICATIONS  (STANDING):  amLODIPine   Tablet 5 milliGRAM(s) Oral daily  cefTRIAXone   IVPB 1000 milliGRAM(s) IV Intermittent every 24 hours  dextrose 50% Injectable 12.5 Gram(s) IV Push once  dextrose 50% Injectable 25 Gram(s) IV Push once  dextrose 50% Injectable 25 Gram(s) IV Push once  enoxaparin Injectable 40 milliGRAM(s) SubCutaneous daily  influenza   Vaccine 0.5 milliLiter(s) IntraMuscular once  insulin lispro (HumaLOG) corrective regimen sliding scale   SubCutaneous three times a day before meals  insulin lispro (HumaLOG) corrective regimen sliding scale   SubCutaneous at bedtime  lactated ringers. 1000 milliLiter(s) (70 mL/Hr) IV Continuous <Continuous>  simvastatin 20 milliGRAM(s) Oral at bedtime  venlafaxine 75 milliGRAM(s) Oral daily    MEDICATIONS  (PRN):  dextrose 40% Gel 15 Gram(s) Oral once PRN Blood Glucose LESS THAN 70 milliGRAM(s)/deciliter  glucagon  Injectable 1 milliGRAM(s) IntraMuscular once PRN Glucose LESS THAN 70 milligrams/deciliter      Vital Signs Last 24 Hrs  T(C): 36.7 (13 Sep 2019 09:26), Max: 36.8 (12 Sep 2019 13:00)  T(F): 98.1 (13 Sep 2019 09:26), Max: 98.2 (12 Sep 2019 13:00)  HR: 64 (13 Sep 2019 09:) (64 - 89)  BP: 150/77 (13 Sep 2019 09:26) (131/75 - 181/77)  BP(mean): --  RR: 18 (13 Sep 2019 09:26) (18 - 18)  SpO2: 96% (13 Sep 2019 09:26) (95% - 97%)  CAPILLARY BLOOD GLUCOSE      POCT Blood Glucose.: 76 mg/dL (13 Sep 2019 08:06)  POCT Blood Glucose.: 105 mg/dL (12 Sep 2019 21:24)  POCT Blood Glucose.: 86 mg/dL (12 Sep 2019 17:23)  POCT Blood Glucose.: 91 mg/dL (12 Sep 2019 13:00)    I&O's Summary    12 Sep 2019 07:01  -  13 Sep 2019 07:00  --------------------------------------------------------  IN: 990 mL / OUT: 0 mL / NET: 990 mL        PHYSICAL EXAM:  GENERAL: NAD, well-developed  HEAD:  Atraumatic, Normocephalic  EYES: EOMI, PERRLA, conjunctiva and sclera clear  NECK: Supple, No JVD  CHEST/LUNG: Clear to auscultation bilaterally; No wheeze  HEART: Regular rate and rhythm; No murmurs, rubs, or gallops  ABDOMEN: Soft, Nontender, Nondistended; Bowel sounds present  EXTREMITIES:  2+ Peripheral Pulses, No clubbing, cyanosis, or edema  PSYCH: AAOx3  NEUROLOGY: non-focal  SKIN: No rashes or lesions    LABS:                        12.7   7.93  )-----------( 196      ( 12 Sep 2019 09:02 )             40.2         143  |  107  |  14  ----------------------------<  87  4.4   |  23  |  0.81    Ca    8.4      12 Sep 2019 04:46  Phos  2.9       Mg     2.3         TPro  6.2  /  Alb  3.5  /  TBili  0.3  /  DBili  x   /  AST  12  /  ALT  9<L>  /  AlkPhos  75  -    PT/INR - ( 11 Sep 2019 18:59 )   PT: 10.9 sec;   INR: 0.96 ratio         PTT - ( 11 Sep 2019 18:59 )  PTT:28.7 sec      Urinalysis Basic - ( 11 Sep 2019 20:37 )    Color: Colorless / Appearance: Clear / S.008 / pH: x  Gluc: x / Ketone: Negative  / Bili: Negative / Urobili: Negative   Blood: x / Protein: Negative / Nitrite: Positive   Leuk Esterase: Moderate / RBC: 5 /hpf / WBC 7 /HPF   Sq Epi: x / Non Sq Epi: 1 /hpf / Bacteria: Many        RADIOLOGY & ADDITIONAL TESTS:    Imaging Personally Reviewed:    Consultant(s) Notes Reviewed:      Care Discussed with Consultants/Other Providers: Patient is a 83y old  Female who presents with a chief complaint of Seizure Like Activity (12 Sep 2019 10:52)      SUBJECTIVE / OVERNIGHT EVENTS:  Pt seen and examined. No acute events overnight. Unable to obtain ROS on account of advanced dementia.  Seen by Neurology ; reccs noted. VEEG ongoing.    MEDICATIONS  (STANDING):  amLODIPine   Tablet 5 milliGRAM(s) Oral daily  cefTRIAXone   IVPB 1000 milliGRAM(s) IV Intermittent every 24 hours  dextrose 50% Injectable 12.5 Gram(s) IV Push once  dextrose 50% Injectable 25 Gram(s) IV Push once  dextrose 50% Injectable 25 Gram(s) IV Push once  enoxaparin Injectable 40 milliGRAM(s) SubCutaneous daily  influenza   Vaccine 0.5 milliLiter(s) IntraMuscular once  insulin lispro (HumaLOG) corrective regimen sliding scale   SubCutaneous three times a day before meals  insulin lispro (HumaLOG) corrective regimen sliding scale   SubCutaneous at bedtime  lactated ringers. 1000 milliLiter(s) (70 mL/Hr) IV Continuous <Continuous>  simvastatin 20 milliGRAM(s) Oral at bedtime  venlafaxine 75 milliGRAM(s) Oral daily    MEDICATIONS  (PRN):  dextrose 40% Gel 15 Gram(s) Oral once PRN Blood Glucose LESS THAN 70 milliGRAM(s)/deciliter  glucagon  Injectable 1 milliGRAM(s) IntraMuscular once PRN Glucose LESS THAN 70 milligrams/deciliter      Vital Signs Last 24 Hrs  T(C): 36.7 (13 Sep 2019 09:26), Max: 36.8 (12 Sep 2019 13:00)  T(F): 98.1 (13 Sep 2019 09:26), Max: 98.2 (12 Sep 2019 13:00)  HR: 64 (13 Sep 2019 09:26) (64 - 89)  BP: 150/77 (13 Sep 2019 09:26) (131/75 - 181/77)  BP(mean): --  RR: 18 (13 Sep 2019 09:26) (18 - 18)  SpO2: 96% (13 Sep 2019 09:26) (95% - 97%)  CAPILLARY BLOOD GLUCOSE      POCT Blood Glucose.: 76 mg/dL (13 Sep 2019 08:06)  POCT Blood Glucose.: 105 mg/dL (12 Sep 2019 21:24)  POCT Blood Glucose.: 86 mg/dL (12 Sep 2019 17:23)  POCT Blood Glucose.: 91 mg/dL (12 Sep 2019 13:00)    I&O's Summary    12 Sep 2019 07:01  -  13 Sep 2019 07:00  --------------------------------------------------------  IN: 990 mL / OUT: 0 mL / NET: 990 mL        PHYSICAL EXAM:  GENERAL: NAD, anicteric, afebrile  HEAD:  Atraumatic, Normocephalic  EYES: EOMI, PERRLA, conjunctiva and sclera clear  NECK: Supple, No JVD  CHEST/LUNG: Clear to auscultation bilaterally; No wheeze  HEART: Regular rate and rhythm; No murmurs, rubs, or gallops  ABDOMEN: Soft, Nontender, Nondistended; Bowel sounds present  EXTREMITIES:  2+ Peripheral Pulses, No clubbing, cyanosis, or edema  PSYCH: Awake, alert. Follows some simple commands (open/close eyes); non verbal  NEUROLOGY:  Normal muscle bulk. Bilateral upper extremity rigidity greater on right than left. Cogwheel rigidity RUE>LUE.    Power 2/5 UE/LE b/  SKIN: No rashes or lesions    LABS:                        12.7   7.93  )-----------( 196      ( 12 Sep 2019 09:02 )             40.2     -    143  |  107  |  14  ----------------------------<  87  4.4   |  23  |  0.81    Ca    8.4      12 Sep 2019 04:46  Phos  2.9       Mg     2.3         TPro  6.2  /  Alb  3.5  /  TBili  0.3  /  DBili  x   /  AST  12  /  ALT  9<L>  /  AlkPhos  75  -11    PT/INR - ( 11 Sep 2019 18:59 )   PT: 10.9 sec;   INR: 0.96 ratio         PTT - ( 11 Sep 2019 18:59 )  PTT:28.7 sec      Urinalysis Basic - ( 11 Sep 2019 20:37 )    Color: Colorless / Appearance: Clear / S.008 / pH: x  Gluc: x / Ketone: Negative  / Bili: Negative / Urobili: Negative   Blood: x / Protein: Negative / Nitrite: Positive   Leuk Esterase: Moderate / RBC: 5 /hpf / WBC 7 /HPF   Sq Epi: x / Non Sq Epi: 1 /hpf / Bacteria: Many        RADIOLOGY & ADDITIONAL TESTS:    Imaging Personally Reviewed:    Consultant(s) Notes Reviewed:      Care Discussed with Consultants/Other Providers: Patient is a 83y old  Female who presents with a chief complaint of Seizure Like Activity (12 Sep 2019 10:52)      SUBJECTIVE / OVERNIGHT EVENTS:  Pt seen and examined. No acute events overnight. Unable to obtain ROS on account of advanced dementia.  Seen by Neurology ; reccs noted. VEEG ongoing.    MEDICATIONS  (STANDING):  amLODIPine   Tablet 5 milliGRAM(s) Oral daily  cefTRIAXone   IVPB 1000 milliGRAM(s) IV Intermittent every 24 hours  dextrose 50% Injectable 12.5 Gram(s) IV Push once  dextrose 50% Injectable 25 Gram(s) IV Push once  dextrose 50% Injectable 25 Gram(s) IV Push once  enoxaparin Injectable 40 milliGRAM(s) SubCutaneous daily  influenza   Vaccine 0.5 milliLiter(s) IntraMuscular once  insulin lispro (HumaLOG) corrective regimen sliding scale   SubCutaneous three times a day before meals  insulin lispro (HumaLOG) corrective regimen sliding scale   SubCutaneous at bedtime  lactated ringers. 1000 milliLiter(s) (70 mL/Hr) IV Continuous <Continuous>  simvastatin 20 milliGRAM(s) Oral at bedtime  venlafaxine 75 milliGRAM(s) Oral daily    MEDICATIONS  (PRN):  dextrose 40% Gel 15 Gram(s) Oral once PRN Blood Glucose LESS THAN 70 milliGRAM(s)/deciliter  glucagon  Injectable 1 milliGRAM(s) IntraMuscular once PRN Glucose LESS THAN 70 milligrams/deciliter      Vital Signs Last 24 Hrs  T(C): 36.7 (13 Sep 2019 09:26), Max: 36.8 (12 Sep 2019 13:00)  T(F): 98.1 (13 Sep 2019 09:26), Max: 98.2 (12 Sep 2019 13:00)  HR: 64 (13 Sep 2019 09:26) (64 - 89)  BP: 150/77 (13 Sep 2019 09:26) (131/75 - 181/77)  BP(mean): --  RR: 18 (13 Sep 2019 09:26) (18 - 18)  SpO2: 96% (13 Sep 2019 09:26) (95% - 97%)  CAPILLARY BLOOD GLUCOSE      POCT Blood Glucose.: 76 mg/dL (13 Sep 2019 08:06)  POCT Blood Glucose.: 105 mg/dL (12 Sep 2019 21:24)  POCT Blood Glucose.: 86 mg/dL (12 Sep 2019 17:23)  POCT Blood Glucose.: 91 mg/dL (12 Sep 2019 13:00)    I&O's Summary    12 Sep 2019 07:01  -  13 Sep 2019 07:00  --------------------------------------------------------  IN: 990 mL / OUT: 0 mL / NET: 990 mL        PHYSICAL EXAM:  GENERAL: NAD, anicteric, afebrile  HEAD:  Atraumatic, Normocephalic  NECK: Supple, No JVD  CHEST/LUNG: Clear to auscultation bilaterally; No wheeze  HEART: Regular rate and rhythm; No murmurs, rubs, or gallops  ABDOMEN: Soft, Nontender, Nondistended; Bowel sounds present  EXTREMITIES:  2+ Peripheral Pulses, No clubbing, cyanosis, or edema  PSYCH: Awake, alert. Follows some simple commands (open/close eyes); non verbal  NEUROLOGY:  Normal muscle bulk. Bilateral upper extremity rigidity greater on right than left. Cogwheel rigidity RUE>LUE.    Power 2/5 UE/LE b/  SKIN: No rashes or lesions    LABS:                        12.7   7.93  )-----------( 196      ( 12 Sep 2019 09:02 )             40.2     -12    143  |  107  |  14  ----------------------------<  87  4.4   |  23  |  0.81    Ca    8.4      12 Sep 2019 04:46  Phos  2.9       Mg     2.3         TPro  6.2  /  Alb  3.5  /  TBili  0.3  /  DBili  x   /  AST  12  /  ALT  9<L>  /  AlkPhos  75  09-11    PT/INR - ( 11 Sep 2019 18:59 )   PT: 10.9 sec;   INR: 0.96 ratio         PTT - ( 11 Sep 2019 18:59 )  PTT:28.7 sec      Urinalysis Basic - ( 11 Sep 2019 20:37 )    Color: Colorless / Appearance: Clear / S.008 / pH: x  Gluc: x / Ketone: Negative  / Bili: Negative / Urobili: Negative   Blood: x / Protein: Negative / Nitrite: Positive   Leuk Esterase: Moderate / RBC: 5 /hpf / WBC 7 /HPF   Sq Epi: x / Non Sq Epi: 1 /hpf / Bacteria: Many        RADIOLOGY & ADDITIONAL TESTS:    Imaging Personally Reviewed:    Consultant(s) Notes Reviewed:      Care Discussed with Consultants/Other Providers:

## 2019-09-13 NOTE — PROGRESS NOTE ADULT - PROBLEM SELECTOR PLAN 3
- Memantine on hold ; unclear benefit given advanced dementia  -Aspiration Precautions  -Per daughter pt is DNR , no feeding tube ; MOLST form in chart - restart Memantine per Neurology reccs  -Aspiration Precautions  -Per daughter pt is DNR , no feeding tube ; MOLST form in chart

## 2019-09-13 NOTE — DISCHARGE NOTE NURSING/CASE MANAGEMENT/SOCIAL WORK - PATIENT PORTAL LINK FT
You can access the FollowMyHealth Patient Portal offered by [facility] by registering at the following website: [email or web address]. By joining [facility]’s FollowMyHealth portal, you will also be able to view your health information using other applications (apps) compatible with our system.

## 2019-09-13 NOTE — PROGRESS NOTE ADULT - PROBLEM SELECTOR PLAN 2
- c/w Ceftriaxone 1 g daily  -blood cultures negative - c/w Ceftriaxone 1 g daily day 3/4  -blood cultures negative

## 2019-09-13 NOTE — PROGRESS NOTE ADULT - PROBLEM SELECTOR PLAN 1
- per family mental status is back to baseline  - seizure may have been 2/2 infectious process ( UTI)  - no seizure activity since admission  - c/w Mechanical Soft diet w/assisted feeds  - Aspiration precautions  - f/up Prolactin Level/TSH/ RPR wnl  - UTOX negative  - Neurochecks q4  - VEEG ongoing ; no seizure activity noted.  - Brain MRI  per Neuro reccs  - Neurology following  - Lasix held  and given gentle hydration for possible convulsive syncope - per family mental status is back to baseline  - seizure may have been 2/2 infectious process ( UTI)  - no seizure activity since admission  - c/w Mechanical Soft diet w/assisted feeds  - Aspiration precautions  - f/up Prolactin Level/TSH/ RPR wnl  - UTOX negative  - Neurochecks q4  - VEEG ongoing ; no seizure activity noted.  - Neuro reccs Brain MRI ; daughter reports that pt has metal in her leg from previous sx ; will f/up re : MRI compatibility  - Neurology following  - Lasix held  and given gentle hydration for possible convulsive syncope - per family mental status is back to baseline  - seizure may have been 2/2 infectious process ( UTI)  - no seizure activity since admission  - c/w Mechanical Soft diet w/assisted feeds  - Aspiration precautions  - f/up Prolactin Level/TSH/ RPR wnl  - UTOX negative  - Neurochecks q4  - VEEG w/ no seizure activity noted.  - No indication for  MRI Brain at this time per Neurology ; will d/c order

## 2019-09-13 NOTE — PROGRESS NOTE ADULT - ASSESSMENT
83F with PMHx of advanced dementia , minimally verbal, HTN, and T2DM who presents to [facility] for one day of seizure-like activity. Found to have bacteriuria and pyuria, but is unable to endorse symptoms.

## 2019-09-13 NOTE — DISCHARGE NOTE PROVIDER - HOSPITAL COURSE
83F with PMHx of advanced dementia , minimally verbal, HTN, and T2DM who presents to [facility] for one day of seizure-like activity. Found to have bacteriuria and pyuria, but is unable to endorse symptoms. As per family mental status is back to baseline, seizure may have been 2/2 infectious process ( UTI), no seizure activity since admission -Prolactin Level/TSH/ RPR wnl, UTOX negative. VEEG w/ no seizure activity noted. No indication for  MRI Brain at this time per Neurology ; will d/c order. Conintued with Neurochecks and aspiration precautions         Problem/Plan - 1:    ·  Problem: Seizure.  Plan: - per family mental status is back to baseline    - seizure may have been 2/2 infectious process ( UTI)    - no seizure activity since admission    - c/w Mechanical Soft diet w/assisted feeds    - Aspiration precautions    - f/up Prolactin Level/TSH/ RPR wnl    - UTOX negative    - Neurochecks q4    - VEEG w/ no seizure activity noted.    - No indication for  MRI Brain at this time per Neurology ; will d/c order.          Problem/Plan - 2:    ·  Problem: Pyuria.  Plan: - c/w Ceftriaxone 1 g daily day 3/4    -blood cultures negative.          Problem/Plan - 3:    ·  Problem: Alzheimer's dementia.  Plan: - restart Memantine per Neurology recs    -Aspiration Precautions    -Per daughter pt is DNR , no feeding tube ; MOLST form in chart.          Problem/Plan - 4:    ·  Problem: Diabetes.  Plan: -Hold home metformin and Januvia as inpatient    - GIA    -  qac qhs.          Problem/Plan - 5:    ·  Problem: HTN (hypertension).  Plan: -Continue with Amlodipine 5mg qd    -  Lasix on hold.          Problem/Plan - 6:    Problem: Prolonged QT interval. Plan: -Continue with Effexor, but with caution    -Avoid other QT prolonging medications. 83F with PMHx of advanced dementia , minimally verbal, HTN, and T2DM who presents to [facility] for one day of seizure-like activity. Found to have bacteriuria and pyuria, but is unable to endorse symptoms. As per family mental status is back to baseline, seizure may have been 2/2 infectious process ( UTI), no seizure activity since admission -Prolactin Level/TSH/ RPR wnl, UTOX negative. VEEG w/ no seizure activity noted. No indication for  MRI Brain at this time per Neurology ; will d/c order. Conintued with Neurochecks and aspiration precautions. Pt found to have puria in urine , started on ceftriaxone. Blood cx negative . Will restart Memantine per Neurology recs. For HTN, will c/w norvasc, but will hold Lasix as pt is at risk for dehydration. Per daughter pt is DNR , no feeding tube ; MOLST form in chart. Pt stable for discharge and to follow up with her PCP.

## 2019-09-13 NOTE — DISCHARGE NOTE PROVIDER - CARE PROVIDER_API CALL
Domingo Delgado)  Internal Medicine  [address] [facility],  [address]  Phone: [phone]  Fax: [phone]  Follow Up Time:

## 2019-09-14 VITALS
SYSTOLIC BLOOD PRESSURE: 143 MMHG | RESPIRATION RATE: 18 BRPM | HEART RATE: 67 BPM | OXYGEN SATURATION: 97 % | TEMPERATURE: 97 F | DIASTOLIC BLOOD PRESSURE: 78 MMHG

## 2019-09-14 PROCEDURE — 99239 HOSP IP/OBS DSCHRG MGMT >30: CPT

## 2019-09-14 RX ADMIN — CEFTRIAXONE 100 MILLIGRAM(S): 500 INJECTION, POWDER, FOR SOLUTION INTRAMUSCULAR; INTRAVENOUS at 12:26

## 2019-09-14 RX ADMIN — MEMANTINE HYDROCHLORIDE 10 MILLIGRAM(S): 10 TABLET ORAL at 06:26

## 2019-09-14 RX ADMIN — ENOXAPARIN SODIUM 40 MILLIGRAM(S): 100 INJECTION SUBCUTANEOUS at 11:22

## 2019-09-14 RX ADMIN — AMLODIPINE BESYLATE 5 MILLIGRAM(S): 2.5 TABLET ORAL at 06:26

## 2019-09-14 NOTE — PROGRESS NOTE ADULT - PROBLEM SELECTOR PLAN 3
- restart Memantine per Neurology reccs  -Aspiration Precautions  -Per daughter pt is DNR , no feeding tube ; MOLST form in chart

## 2019-09-14 NOTE — PROGRESS NOTE ADULT - PROBLEM SELECTOR PLAN 6
-Continue with Effexor, but with caution  -Avoid other QT prolonging medications

## 2019-09-14 NOTE — PROGRESS NOTE ADULT - PROBLEM SELECTOR PLAN 4
-Hold home metformin and Januvia as inpatient  - GIA  - FS qac qhs

## 2019-09-14 NOTE — PROGRESS NOTE ADULT - PROBLEM SELECTOR PLAN 5
-Continue with Amlodipine 5mg qd  -  Lasix on hold

## 2019-09-14 NOTE — EEG REPORT - NS EEG TEXT BOX
KACEY GONZALEZ [id number]     Study Date: 		09-12-19    ROUTINE EEG    Technical Information:			  		  Placement and Labeling of Electrodes:  The EEG was performed utilizing 20 channels referential EEG connections (coronal over temporal over parasagittal montage) using all standard 10-20 electrode placements with EKG.  Recording was at a sampling rate of 256 samples per second per channel.  Time synchronized digital video recording was done simultaneously with EEG recording.  A low light infrared camera was used for low light recording.  Alex and seizure detection algorithms were utilized.    CSA Technical Component:  Quantitative EEG analysis using a separate Compressed Spectral Array (CSA) software package was conducted in real-time and run at bedside after set up by the technician, digitally displaying the power of electrographic frequencies included in the 1-30Hz band using a graded color map.  This data was reviewed and interpreted independently, and is reported in a separate section below.    --------------------------------------------------------------------------------------------------  History:  CC/ HPI Patient is a 83y old  Female who presents with a chief complaint of Seizure Like Activity (12 Sep 2019 10:52)    MEDICATIONS  (STANDING):  amLODIPine   Tablet 5 milliGRAM(s) Oral daily  cefTRIAXone   IVPB 1000 milliGRAM(s) IV Intermittent every 24 hours  dextrose 50% Injectable 12.5 Gram(s) IV Push once  dextrose 50% Injectable 25 Gram(s) IV Push once  dextrose 50% Injectable 25 Gram(s) IV Push once  enoxaparin Injectable 40 milliGRAM(s) SubCutaneous daily  influenza   Vaccine 0.5 milliLiter(s) IntraMuscular once  insulin lispro (HumaLOG) corrective regimen sliding scale   SubCutaneous three times a day before meals  insulin lispro (HumaLOG) corrective regimen sliding scale   SubCutaneous at bedtime  lactated ringers. 1000 milliLiter(s) (70 mL/Hr) IV Continuous <Continuous>  simvastatin 20 milliGRAM(s) Oral at bedtime  venlafaxine 75 milliGRAM(s) Oral daily    --------------------------------------------------------------------------------------------------  Study Interpretation:    [[[Abbreviation Key:  PDR=alpha rhythm/posterior dominant rhythm. A-P=anterior posterior gradient.  Amplitude: ‘very low’:<20; ‘low’:20-50; ‘medium’:; ‘high’:>200uV.  Persistence for periodic/rhythmic patterns (% of epoch) ‘rare’:<1%; ‘occasional’:1-10%; ‘frequent’:10-50%; ‘abundant’:50-90%; ‘continuous’:>90%.  Persistence for sporadic discharges: ‘rare’:<1/hr; ‘occasional’:1/min-1/hr; ‘frequent’:>1/min; ‘abundant’:>1/10 sec.  GRDA=generalized rhythmic delta activity, LRDA=lateralized rhythmic delta activity, TIRDA=temporal intermittent rhythmic delta activity, FIRDA=frontal intermittent rhythmic activity. LPD=PLED=lateralized periodic discharges, GPD=generalized periodic discharges, BiPDs=BiPLEDs=bilateral independent periodic epileptiform discharges, SIRPID=stimulus induced rhythmic, periodic, or ictal appearing discharges.  Modifiers: +F=with fast component, +S=with spike component, +R=with rhythmic component.  S-B=burst suppression pattern.  Max=maximal. N1-drowsy, N2-stage II sleep, N3-slow wave sleep.  HV=hyperventilation, PS=photic stimulation]]]    FINDINGS:  The background was continuous, spontaneously variable and reactive.  During wakefulness, the posteriorly dominant rhythm was poorly modulated.      There was more diffuse irregular theta and delta activity present.    Sleep Background:  Drowsiness was characterized by fragmentation, attenuation, and slowing of the background activity.    Sleep was characterized by the presence of vertex waves, symmetric spindles, and K-complexes.    Epileptiform Activity:   No epileptiform discharges were present.    Events:  No clinical events were recorded.  No seizures were recorded.    Activation Procedures:   -Hyperventilation was not performed.    -Photic stimulation was not performed.    Artifacts:  Intermittent myogenic and movement artifacts were noted.    ECG:  The heart rate on single channel ECG at baseline was predominantly near BPM = 60-70  -----------------------------------------------------------------------------------------------------    EEG Classification / Summary:  Abnormal EEG study  Moderate background slowing    -----------------------------------------------------------------------------------------------------    Clinical Impression:  Moderate diffuse or multifocal cerebral dysfunction, not specific as to etiology.  There were no epileptiform abnormalities recorded.      -------------------------------------------------------------------------------------------------------  Roderick Kat M.D.   of Neurology, [facility] Epilepsy [place]
KACEY GONZALEZ [id number]     Study Date: 		09-12-19 17:45   	to		 9-13-19   08:05    VEEG    Technical Information:			  		  Placement and Labeling of Electrodes:  The EEG was performed utilizing 20 channels referential EEG connections (coronal over temporal over parasagittal montage) using all standard 10-20 electrode placements with EKG.  Recording was at a sampling rate of 256 samples per second per channel.  Time synchronized digital video recording was done simultaneously with EEG recording.  A low light infrared camera was used for low light recording.  Alex and seizure detection algorithms were utilized.    CSA Technical Component:  Quantitative EEG analysis using a separate Compressed Spectral Array (CSA) software package was conducted in real-time and run at bedside after set up by the technician, digitally displaying the power of electrographic frequencies included in the 1-30Hz band using a graded color map.  This data was reviewed and interpreted independently, and is reported in a separate section below.    --------------------------------------------------------------------------------------------------  History:  CC/ HPI Patient is a 83y old  Female who presents with a chief complaint of Seizure Like Activity (12 Sep 2019 10:52)    MEDICATIONS  (STANDING):  amLODIPine   Tablet 5 milliGRAM(s) Oral daily  cefTRIAXone   IVPB 1000 milliGRAM(s) IV Intermittent every 24 hours  dextrose 50% Injectable 12.5 Gram(s) IV Push once  dextrose 50% Injectable 25 Gram(s) IV Push once  dextrose 50% Injectable 25 Gram(s) IV Push once  enoxaparin Injectable 40 milliGRAM(s) SubCutaneous daily  influenza   Vaccine 0.5 milliLiter(s) IntraMuscular once  insulin lispro (HumaLOG) corrective regimen sliding scale   SubCutaneous three times a day before meals  insulin lispro (HumaLOG) corrective regimen sliding scale   SubCutaneous at bedtime  lactated ringers. 1000 milliLiter(s) (70 mL/Hr) IV Continuous <Continuous>  simvastatin 20 milliGRAM(s) Oral at bedtime  venlafaxine 75 milliGRAM(s) Oral daily    MEDICATIONS  (PRN):  dextrose 40% Gel 15 Gram(s) Oral once PRN Blood Glucose LESS THAN 70 milliGRAM(s)/deciliter  glucagon  Injectable 1 milliGRAM(s) IntraMuscular once PRN Glucose LESS THAN 70 milligrams/deciliter    --------------------------------------------------------------------------------------------------  Study Interpretation:    [[[Abbreviation Key:  PDR=alpha rhythm/posterior dominant rhythm. A-P=anterior posterior gradient.  Amplitude: ‘very low’:<20; ‘low’:20-50; ‘medium’:; ‘high’:>200uV.  Persistence for periodic/rhythmic patterns (% of epoch) ‘rare’:<1%; ‘occasional’:1-10%; ‘frequent’:10-50%; ‘abundant’:50-90%; ‘continuous’:>90%.  Persistence for sporadic discharges: ‘rare’:<1/hr; ‘occasional’:1/min-1/hr; ‘frequent’:>1/min; ‘abundant’:>1/10 sec.  GRDA=generalized rhythmic delta activity, LRDA=lateralized rhythmic delta activity, TIRDA=temporal intermittent rhythmic delta activity, FIRDA=frontal intermittent rhythmic activity. LPD=PLED=lateralized periodic discharges, GPD=generalized periodic discharges, BiPDs=BiPLEDs=bilateral independent periodic epileptiform discharges, SIRPID=stimulus induced rhythmic, periodic, or ictal appearing discharges.  Modifiers: +F=with fast component, +S=with spike component, +R=with rhythmic component.  S-B=burst suppression pattern.  Max=maximal. N1-drowsy, N2-stage II sleep, N3-slow wave sleep.  HV=hyperventilation, PS=photic stimulation]]]    FINDINGS:  The background was continuous, spontaneously variable and reactive.  During wakefulness, the posteriorly dominant rhythm was poorly modulated.  There was more diffuse irregular 6-7hz theta and delta activity present.    Sleep Background:  Drowsiness was characterized by fragmentation, attenuation, and slowing of the background activity.    Sleep was characterized by the presence of vertex waves, symmetric spindles, and K-complexes.    Epileptiform Activity:   No epileptiform discharges were present.    Events:  No clinical events were recorded.  No seizures were recorded.    Activation Procedures:   -Hyperventilation was not performed.    -Photic stimulation was not performed.    Artifacts:  Intermittent myogenic and movement artifacts were noted.    ECG:  The heart rate on single channel ECG at baseline was predominantly near BPM = 60-70  -----------------------------------------------------------------------------------------------------    EEG Classification / Summary:  Abnormal EEG study  Moderate background slowing    -----------------------------------------------------------------------------------------------------    Clinical Impression:  Moderate diffuse or multifocal cerebral dysfunction, not specific as to etiology.  Patient notably electrographically awake all night.  There were no epileptiform abnormalities recorded.      -------------------------------------------------------------------------------------------------------  Roderick Kat M.D.   of Neurology, [facility] Epilepsy [place]
KACEY GONZALEZ [id number]     Study Date: 		09-13-19 08:09   	to		 9-14-19   10:05    VEEG    Technical Information:			  		  Placement and Labeling of Electrodes:  The EEG was performed utilizing 20 channels referential EEG connections (coronal over temporal over parasagittal montage) using all standard 10-20 electrode placements with EKG.  Recording was at a sampling rate of 256 samples per second per channel.  Time synchronized digital video recording was done simultaneously with EEG recording.  A low light infrared camera was used for low light recording.  Alex and seizure detection algorithms were utilized.    CSA Technical Component:  Quantitative EEG analysis using a separate Compressed Spectral Array (CSA) software package was conducted in real-time and run at bedside after set up by the technician, digitally displaying the power of electrographic frequencies included in the 1-30Hz band using a graded color map.  This data was reviewed and interpreted independently, and is reported in a separate section below.    --------------------------------------------------------------------------------------------------  History:  CC/ HPI Patient is a 83y old  Female who presents with a chief complaint of Seizure Like Activity (12 Sep 2019 10:52)    MEDICATIONS  (STANDING):  amLODIPine   Tablet 5 milliGRAM(s) Oral daily  cefTRIAXone   IVPB 1000 milliGRAM(s) IV Intermittent every 24 hoursenoxaparin Injectable 40 milliGRAM(s) SubCutaneous daily  influenza   Vaccine 0.5 milliLiter(s) IntraMuscular once  insulin lispro (HumaLOG) corrective regimen sliding scale   SubCutaneous three times a day before meals  insulin lispro (HumaLOG) corrective regimen sliding scale   SubCutaneous at bedtime  lactated ringers. 1000 milliLiter(s) (70 mL/Hr) IV Continuous <Continuous>  simvastatin 20 milliGRAM(s) Oral at bedtime  venlafaxine 75 milliGRAM(s) Oral daily    MEDICATIONS  (PRN):  dextrose 40% Gel 15 Gram(s) Oral once PRN Blood Glucose LESS THAN 70 milliGRAM(s)/deciliter  glucagon  Injectable 1 milliGRAM(s) IntraMuscular once PRN Glucose LESS THAN 70 milligrams/deciliter    --------------------------------------------------------------------------------------------------  Study Interpretation:    [[[Abbreviation Key:  PDR=alpha rhythm/posterior dominant rhythm. A-P=anterior posterior gradient.  Amplitude: ‘very low’:<20; ‘low’:20-50; ‘medium’:; ‘high’:>200uV.  Persistence for periodic/rhythmic patterns (% of epoch) ‘rare’:<1%; ‘occasional’:1-10%; ‘frequent’:10-50%; ‘abundant’:50-90%; ‘continuous’:>90%.  Persistence for sporadic discharges: ‘rare’:<1/hr; ‘occasional’:1/min-1/hr; ‘frequent’:>1/min; ‘abundant’:>1/10 sec.  GRDA=generalized rhythmic delta activity, LRDA=lateralized rhythmic delta activity, TIRDA=temporal intermittent rhythmic delta activity, FIRDA=frontal intermittent rhythmic activity. LPD=PLED=lateralized periodic discharges, GPD=generalized periodic discharges, BiPDs=BiPLEDs=bilateral independent periodic epileptiform discharges, SIRPID=stimulus induced rhythmic, periodic, or ictal appearing discharges.  Modifiers: +F=with fast component, +S=with spike component, +R=with rhythmic component.  S-B=burst suppression pattern.  Max=maximal. N1-drowsy, N2-stage II sleep, N3-slow wave sleep.  HV=hyperventilation, PS=photic stimulation]]]    FINDINGS:  The background was continuous, spontaneously variable and reactive.  During wakefulness, the posteriorly dominant rhythm was poorly modulated.  There was more diffuse irregular 6-7hz theta and delta activity present.    Sleep Background:  Drowsiness was characterized by fragmentation, attenuation, and slowing of the background activity.    Sleep was characterized by the presence of vertex waves, symmetric spindles, and K-complexes.    Epileptiform Activity:   No epileptiform discharges were present.    Events:  No clinical events were recorded.  No seizures were recorded.    Activation Procedures:   -Hyperventilation was not performed.    -Photic stimulation was not performed.    Artifacts:  Intermittent myogenic and movement artifacts were noted.  [id number] multiple artifacts, electrodes repaired    ECG:  The heart rate on single channel ECG at baseline was predominantly near BPM = 60-70  -----------------------------------------------------------------------------------------------------    EEG Classification / Summary:  Abnormal EEG study  Moderate background slowing    -----------------------------------------------------------------------------------------------------    Clinical Impression:  Moderate diffuse or multifocal cerebral dysfunction, not specific as to etiology.  Patient notably electrographically awake most of night, likely representative of circadian disruption.  There were no epileptiform abnormalities recorded.      -------------------------------------------------------------------------------------------------------  Roderick Kat M.D.   of Neurology, [facility] Epilepsy [place]

## 2019-09-14 NOTE — PROGRESS NOTE ADULT - PROBLEM SELECTOR PLAN 1
- per family mental status is back to baseline  - seizure may have been 2/2 infectious process ( UTI)  - no seizure activity since admission  - c/w Mechanical Soft diet w/assisted feeds  - Aspiration precautions  - f/up Prolactin Level/TSH/ RPR wnl  - UTOX negative  - Neurochecks q4  - VEEG w/ no seizure activity noted.  - No indication for  MRI Brain at this time per Neurology ; will d/c order

## 2019-09-14 NOTE — PROGRESS NOTE ADULT - SUBJECTIVE AND OBJECTIVE BOX
Patient is a 83y old  Female who presents with a chief complaint of Seizure Like Activity (13 Sep 2019 19:04)      SUBJECTIVE / OVERNIGHT EVENTS: nonverbal , appears comfortable    MEDICATIONS  (STANDING):  amLODIPine   Tablet 5 milliGRAM(s) Oral daily  cefTRIAXone   IVPB 1000 milliGRAM(s) IV Intermittent every 24 hours  dextrose 50% Injectable 12.5 Gram(s) IV Push once  dextrose 50% Injectable 25 Gram(s) IV Push once  dextrose 50% Injectable 25 Gram(s) IV Push once  enoxaparin Injectable 40 milliGRAM(s) SubCutaneous daily  influenza   Vaccine 0.5 milliLiter(s) IntraMuscular once  insulin lispro (HumaLOG) corrective regimen sliding scale   SubCutaneous three times a day before meals  insulin lispro (HumaLOG) corrective regimen sliding scale   SubCutaneous at bedtime  lactated ringers. 1000 milliLiter(s) (70 mL/Hr) IV Continuous <Continuous>  memantine 10 milliGRAM(s) Oral two times a day  simvastatin 20 milliGRAM(s) Oral at bedtime  venlafaxine 75 milliGRAM(s) Oral daily    MEDICATIONS  (PRN):  dextrose 40% Gel 15 Gram(s) Oral once PRN Blood Glucose LESS THAN 70 milliGRAM(s)/deciliter  glucagon  Injectable 1 milliGRAM(s) IntraMuscular once PRN Glucose LESS THAN 70 milligrams/deciliter        CAPILLARY BLOOD GLUCOSE      POCT Blood Glucose.: 131 mg/dL (13 Sep 2019 22:31)  POCT Blood Glucose.: 127 mg/dL (13 Sep 2019 18:05)  POCT Blood Glucose.: 115 mg/dL (13 Sep 2019 13:04)    I&O's Summary    13 Sep 2019 07:01  -  14 Sep 2019 07:00  --------------------------------------------------------  IN: 740 mL / OUT: 3200 mL / NET: -2460 mL        PHYSICAL EXAM:  GENERAL: NAD, well-developed  HEAD:  Atraumatic, Normocephalic  EYES: conjunctiva and sclera clear  NECK:  No JVD  CHEST/LUNG: Clear to auscultation bilaterally; No wheeze  HEART: Regular rate and rhythm; No murmurs, rubs, or gallops  ABDOMEN: Soft, Nontender, Nondistended; Bowel sounds present  EXTREMITIES:  2+ Peripheral Pulses, No clubbing, cyanosis, or edema  PSYCH: non verbal      LABS:      Phos  2.9     09-12                RADIOLOGY & ADDITIONAL TESTS:    Imaging Personally Reviewed:    Consultant(s) Notes Reviewed:      Care Discussed with Consultants/Other Providers:

## 2019-09-14 NOTE — PROGRESS NOTE ADULT - ASSESSMENT
83F with PMHx of advanced dementia , minimally verbal, HTN, and T2DM who presents to [facility] for one day of seizure-like activity. Found to have bacteriuria and pyuria, but is unable to endorse symptoms.

## 2019-09-17 LAB
CULTURE RESULTS: SIGNIFICANT CHANGE UP
CULTURE RESULTS: SIGNIFICANT CHANGE UP
SPECIMEN SOURCE: SIGNIFICANT CHANGE UP
SPECIMEN SOURCE: SIGNIFICANT CHANGE UP

## 2019-10-01 PROCEDURE — 96374 THER/PROPH/DIAG INJ IV PUSH: CPT

## 2019-10-01 PROCEDURE — 86140 C-REACTIVE PROTEIN: CPT

## 2019-10-01 PROCEDURE — 84443 ASSAY THYROID STIM HORMONE: CPT

## 2019-10-01 PROCEDURE — 70450 CT HEAD/BRAIN W/O DYE: CPT

## 2019-10-01 PROCEDURE — 80048 BASIC METABOLIC PNL TOTAL CA: CPT

## 2019-10-01 PROCEDURE — 85610 PROTHROMBIN TIME: CPT

## 2019-10-01 PROCEDURE — 85730 THROMBOPLASTIN TIME PARTIAL: CPT

## 2019-10-01 PROCEDURE — 95951: CPT

## 2019-10-01 PROCEDURE — 82435 ASSAY OF BLOOD CHLORIDE: CPT

## 2019-10-01 PROCEDURE — 81001 URINALYSIS AUTO W/SCOPE: CPT

## 2019-10-01 PROCEDURE — 86780 TREPONEMA PALLIDUM: CPT

## 2019-10-01 PROCEDURE — 82803 BLOOD GASES ANY COMBINATION: CPT

## 2019-10-01 PROCEDURE — 99285 EMERGENCY DEPT VISIT HI MDM: CPT | Mod: 25

## 2019-10-01 PROCEDURE — 84145 PROCALCITONIN (PCT): CPT

## 2019-10-01 PROCEDURE — 82330 ASSAY OF CALCIUM: CPT

## 2019-10-01 PROCEDURE — 71045 X-RAY EXAM CHEST 1 VIEW: CPT

## 2019-10-01 PROCEDURE — 82947 ASSAY GLUCOSE BLOOD QUANT: CPT

## 2019-10-01 PROCEDURE — 87040 BLOOD CULTURE FOR BACTERIA: CPT

## 2019-10-01 PROCEDURE — 84132 ASSAY OF SERUM POTASSIUM: CPT

## 2019-10-01 PROCEDURE — 83605 ASSAY OF LACTIC ACID: CPT

## 2019-10-01 PROCEDURE — 83735 ASSAY OF MAGNESIUM: CPT

## 2019-10-01 PROCEDURE — 85014 HEMATOCRIT: CPT

## 2019-10-01 PROCEDURE — 93005 ELECTROCARDIOGRAM TRACING: CPT

## 2019-10-01 PROCEDURE — 80053 COMPREHEN METABOLIC PANEL: CPT

## 2019-10-01 PROCEDURE — 85652 RBC SED RATE AUTOMATED: CPT

## 2019-10-01 PROCEDURE — 82140 ASSAY OF AMMONIA: CPT

## 2019-10-01 PROCEDURE — 95816 EEG AWAKE AND DROWSY: CPT

## 2019-10-01 PROCEDURE — 84146 ASSAY OF PROLACTIN: CPT

## 2019-10-01 PROCEDURE — 84295 ASSAY OF SERUM SODIUM: CPT

## 2019-10-01 PROCEDURE — 82962 GLUCOSE BLOOD TEST: CPT

## 2019-10-01 PROCEDURE — 84100 ASSAY OF PHOSPHORUS: CPT

## 2019-10-01 PROCEDURE — 85027 COMPLETE CBC AUTOMATED: CPT
